# Patient Record
Sex: FEMALE | Race: WHITE
[De-identification: names, ages, dates, MRNs, and addresses within clinical notes are randomized per-mention and may not be internally consistent; named-entity substitution may affect disease eponyms.]

---

## 2017-05-08 ENCOUNTER — HOSPITAL ENCOUNTER (OUTPATIENT)
Dept: HOSPITAL 45 - C.MAMM | Age: 57
Discharge: HOME | End: 2017-05-08
Attending: OBSTETRICS & GYNECOLOGY
Payer: COMMERCIAL

## 2017-05-08 DIAGNOSIS — Z12.31: Primary | ICD-10-CM

## 2017-05-09 NOTE — MAMMOGRAPHY REPORT
BILATERAL DIGITAL SCREENING MAMMOGRAM TOMOSYNTHESIS WITH CAD: 5/8/2017

CLINICAL HISTORY: Routine screening.  Patient has no complaints.  





TECHNIQUE:  Breast tomosynthesis in addition to standard 2D mammography was performed. Current study
 was also evaluated with a Computer Aided Detection (CAD) system.  



COMPARISON: Comparison is made to exams dated:  5/3/2016 mammogram, 5/1/2015 mammogram, 4/30/2014 ma
mmogram - [facility], 3/28/2013 mammogram, 2/16/2011 mammogram, and 3/15/2012 mamm
ogram - [facility].   



BREAST COMPOSITION:  There are scattered areas of fibroglandular density in both breasts.  



FINDINGS: There are mild vascular calcifications in the breasts.  No suspicious mass, architectural 
distortion or cluster of microcalcifications is seen.  



IMPRESSION:  ACR BI-RADS CATEGORY 1: NEGATIVE

There is no mammographic evidence of malignancy. A 1 year screening mammogram is recommended.  The p
atient will receive written notification of the results.  





Approximately 10% of breast cancers are not detected with mammography. A negative mammographic repor
t should not delay biopsy if a clinically suggestive mass is present.



Carol Britt M.D.          

ay/:5/8/2017 21:44:42  



Imaging Technologist: Sushma BORDENR, M, [facility]

letter sent: Normal 1/2  

BI-RADS Code: ACR BI-RADS Category 1: Negative

## 2018-03-28 ENCOUNTER — HOSPITAL ENCOUNTER (INPATIENT)
Dept: HOSPITAL 45 - C.4E | Age: 58
LOS: 4 days | Discharge: HOME | DRG: 168 | End: 2018-04-01
Attending: HOSPITALIST | Admitting: HOSPITALIST
Payer: COMMERCIAL

## 2018-03-28 VITALS
HEART RATE: 92 BPM | OXYGEN SATURATION: 100 % | TEMPERATURE: 97.7 F | SYSTOLIC BLOOD PRESSURE: 116 MMHG | DIASTOLIC BLOOD PRESSURE: 81 MMHG

## 2018-03-28 VITALS
BODY MASS INDEX: 31.67 KG/M2 | HEIGHT: 69 IN | HEIGHT: 69 IN | WEIGHT: 213.85 LBS | BODY MASS INDEX: 31.67 KG/M2 | WEIGHT: 213.85 LBS | BODY MASS INDEX: 31.67 KG/M2

## 2018-03-28 VITALS
SYSTOLIC BLOOD PRESSURE: 146 MMHG | DIASTOLIC BLOOD PRESSURE: 77 MMHG | OXYGEN SATURATION: 97 % | HEART RATE: 82 BPM | TEMPERATURE: 97.52 F

## 2018-03-28 VITALS
DIASTOLIC BLOOD PRESSURE: 82 MMHG | OXYGEN SATURATION: 95 % | TEMPERATURE: 97.52 F | HEART RATE: 85 BPM | SYSTOLIC BLOOD PRESSURE: 141 MMHG

## 2018-03-28 VITALS — OXYGEN SATURATION: 98 % | HEART RATE: 92 BPM

## 2018-03-28 VITALS — HEART RATE: 88 BPM | OXYGEN SATURATION: 98 %

## 2018-03-28 VITALS — OXYGEN SATURATION: 98 % | HEART RATE: 95 BPM

## 2018-03-28 DIAGNOSIS — E66.9: ICD-10-CM

## 2018-03-28 DIAGNOSIS — Z88.0: ICD-10-CM

## 2018-03-28 DIAGNOSIS — J44.9: ICD-10-CM

## 2018-03-28 DIAGNOSIS — Z81.8: ICD-10-CM

## 2018-03-28 DIAGNOSIS — Z79.82: ICD-10-CM

## 2018-03-28 DIAGNOSIS — Z82.49: ICD-10-CM

## 2018-03-28 DIAGNOSIS — E10.9: ICD-10-CM

## 2018-03-28 DIAGNOSIS — Z51.81: ICD-10-CM
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
## 2018-03-28 DIAGNOSIS — Z79.899: ICD-10-CM

## 2018-03-28 DIAGNOSIS — Z83.49: ICD-10-CM

## 2018-03-28 DIAGNOSIS — E78.00: ICD-10-CM

## 2018-03-28 DIAGNOSIS — J45.909: ICD-10-CM

## 2018-03-28 DIAGNOSIS — T17.990A: Primary | ICD-10-CM

## 2018-03-28 DIAGNOSIS — I10: ICD-10-CM

## 2018-03-28 DIAGNOSIS — F32.9: ICD-10-CM

## 2018-03-28 DIAGNOSIS — J10.1: ICD-10-CM

## 2018-03-28 DIAGNOSIS — E78.5: ICD-10-CM

## 2018-03-28 LAB
BUN SERPL-MCNC: 12 MG/DL (ref 7–18)
CALCIUM SERPL-MCNC: 8.6 MG/DL (ref 8.5–10.1)
CO2 SERPL-SCNC: 24 MMOL/L (ref 21–32)
CREAT SERPL-MCNC: 1.19 MG/DL (ref 0.6–1.2)
EOSINOPHIL NFR BLD AUTO: 225 K/UL (ref 130–400)
FLUAV RNA SPEC QL NAA+PROBE: (no result)
FLUBV RNA SPEC QL NAA+PROBE: (no result)
GLUCOSE SERPL-MCNC: 360 MG/DL (ref 70–99)
HCT VFR BLD CALC: 38.6 % (ref 37–47)
HGB BLD-MCNC: 13.6 G/DL (ref 12–16)
INR PPP: 0.9 (ref 0.9–1.1)
MCH RBC QN AUTO: 28 PG (ref 25–34)
MCHC RBC AUTO-ENTMCNC: 35.2 G/DL (ref 32–36)
MCV RBC AUTO: 79.4 FL (ref 80–100)
PMV BLD AUTO: 8.7 FL (ref 7.4–10.4)
POTASSIUM SERPL-SCNC: 3.3 MMOL/L (ref 3.5–5.1)
PTT PATIENT: 22.8 SECONDS (ref 21–31)
RED CELL DISTRIBUTION WIDTH CV: 14.4 % (ref 11.5–14.5)
RED CELL DISTRIBUTION WIDTH SD: 41.4 FL (ref 36.4–46.3)
SODIUM SERPL-SCNC: 130 MMOL/L (ref 136–145)
WBC # BLD AUTO: 5.77 K/UL (ref 4.8–10.8)

## 2018-03-28 RX ADMIN — GABAPENTIN SCH MG: 300 CAPSULE ORAL at 21:00

## 2018-03-28 RX ADMIN — HUMAN INSULIN SCH MLS/HR: 100 INJECTION, SOLUTION SUBCUTANEOUS at 21:30

## 2018-03-28 RX ADMIN — IPRATROPIUM BROMIDE AND ALBUTEROL SULFATE SCH ML: .5; 3 SOLUTION RESPIRATORY (INHALATION) at 15:13

## 2018-03-28 RX ADMIN — OSELTAMIVIR PHOSPHATE SCH MG: 75 CAPSULE ORAL at 20:00

## 2018-03-28 RX ADMIN — ENOXAPARIN SODIUM SCH MG: 40 INJECTION SUBCUTANEOUS at 21:00

## 2018-03-28 RX ADMIN — BENZONATATE SCH MG: 100 CAPSULE ORAL at 20:00

## 2018-03-28 RX ADMIN — HUMAN INSULIN SCH MLS/HR: 100 INJECTION, SOLUTION SUBCUTANEOUS at 23:18

## 2018-03-28 RX ADMIN — IPRATROPIUM BROMIDE AND ALBUTEROL SULFATE SCH ML: .5; 3 SOLUTION RESPIRATORY (INHALATION) at 19:30

## 2018-03-28 RX ADMIN — BENZONATATE SCH MG: 100 CAPSULE ORAL at 15:29

## 2018-03-28 NOTE — HISTORY & PHYSICAL EXAMINATION
DATE OF ADMISSION:  03/28/2018

 

CHIEF COMPLAINT:  Cough and shortness of breath.

 

HISTORY OF PRESENT ILLNESS:  The patient is a very pleasant 57-year-old

female who notes that basically she has been sick nonstop since about the

second week of December.  She has had cough, shortness of breath, wheezing. 

She does note the last couple days have been worse.  Her overall pattern has

been that of an ongoing illness and on the last few days where she does note

also that she was recently unfortunately around a friend who came down with

influenza.  However, she does note that her symptoms have really been ongoing

for months.  Per Dr. Welsh, she has had a violent cough beginning around

[place] time, chest pressure worsened with coughing.  She has had a cardiac

workup that was negative.  CT of the chest with adenopathy, mild bronchial

wall thickening suggesting bronchitis, has been on multiple courses of

antibiotics and apparently oral steroids, but continues to have coughing

paroxysms that lead to several bouts of emesis daily.  She is extremely

tired, fatigued, chronic cough, hemoptysis has been noted.  She denies

fevers, chills, or sweats.  She has not had flu shot, not up to date on

pneumonia vaccine.  To me, she also notes that she has had dripping from her

washing machine, dripping through to the kitchen and she does wonder if she

has mold.

 

REVIEW OF SYSTEMS:  Otherwise negative, except for as above.

 

PAST MEDICAL HISTORY:  Includes chronic asthmatic bronchitis, type 1 diabetes

uncontrolled, depression, hyperlipidemia, and hypertension.

 

HOME MEDICATIONS:  Allegra 180 mg daily, vitamin C 500 mg daily, aspirin 81

mg daily, atorvastatin 40 mg daily, Tessalon 200 mg t.i.d. p.r.n. cough,

gabapentin 900 mg at bedtime, Humalog 1 unit per 5 carbs at breakfast and

lunch 1 unit per 3 carbs at dinner, DuoNebs, Lantus 60 units at bedtime,

lisinopril 2.5 daily, Paxil 10 mg daily, Singulair 10 mg daily, albuterol HFA

q. 4 hours p.r.n. shortness of breath or wheeze and vitamin D 2000

international units daily.

 

PAST SURGICAL HISTORY:  Includes back surgery.

 

FAMILY HISTORY:  Includes her dad having had cardiovascular disease.  There

is also a family history of hypertension and hyperlipidemia.

 

SOCIAL HISTORY:  She is .  She is never a smoker.  No significant

alcohol use.

 

ALLERGIES:  LISTED AS PENICILLINS.

 

PHYSICAL EXAMINATION:

VITAL SIGNS:  Temp 36.5, pulse 92, respiratory rate 20, blood pressure

116/81, 100% on room air.

GENERAL:  She is awake, alert, oriented x3, fatigued appearing, but otherwise

in no acute distress.

HEENT:  Normocephalic, atraumatic.  Mucous membranes are moist.

CARDIOVASCULAR:  Regular without rubs, murmurs, or gallops.

LUNGS:  Diminished bibasilar with scattered wheeze and rhonchi throughout,

more in the mid lungs and upper lungs and again definitely quite quiet at the

bases.  No accessory muscle use.

ABDOMEN:  Soft, nondistended, nontender, no masses or organomegaly.

EXTREMITIES:  Without cyanosis, clubbing or edema.  No calf tenderness.

SKIN:  Shows no rashes, no pallor or icterus.

NEUROLOGIC:  Shows cranial nerves II-XII to be grossly intact.  Gross motor

and sensory intact.

MUSCULOSKELETAL:  Yields no gross lesions.

MENTAL STATUS:  Shows good recent and remote recall.  Normal mood and affect.

 Good judgment and insight.

 

LABORATORIES AND DIAGNOSTICS:  Unavailable at this time.  I am ordering an

initial workup as well as Dr. Welsh is asking for the CT scan done in

[place] to be loaded into the system.

 

ASSESSMENT AND PLAN:

1.  Cough/ongoing asthmatic bronchitis.  The most likely differential for

this is that she is suffering from multiple acute respiratory illnesses.  It

has been a very bad flu in pneumonia season and with her baseline history of

asthmatic allergic asthma, it is very, very probable that she has simply been

suffering from 1 respiratory illness after another without any full ability

to recover.  So, it is quite probable that her likely markedly uncontrolled

diabetes suppressing her immune system set her up for this, but in the

meantime, she also appears to be acutely ill, probably with the flu or a

flu-like illness on top of all of this.  We will check a flu swab, treat with

Tamiflu if positive given the severity of her illness.  We will treat the

asthmatic portion of this with steroids and nebulizers.  We will check a

procalcitonin to help tease apart if there is any subtle indication for

antibiotics given that per Dr. Welsh none were noted on her review of her CT

scan.  We will ask pulmonary to see her.  It is possible she may need a

bronchoscopic evaluation and then we will follow her clinically.  In regards

to her possible mold exposure, it seems far more likely that she is suffering

from repeated and recurrent respiratory illnesses during the middle and tail

end of what has now been a rather bad flu season far more likely than

anything subtle and somewhat more esoteric like a house mold exposure. 

However, I discussed with her that certainly we should not rule this out

entirely empirically and if bronchoscopy is needed, that may provide some

insight as well as if she gets better in the hospital, but gets worse

whenever she goes home.  She notes other than this current hospitalization,

the only other time she was in the hospital was at 23 hours at [facility],

certainly not enough time to remove her from the allergens should that be the

case.

2.  Uncontrolled type 1 diabetes.  Her last A1c was 11.8 at the end of August

last year.  We will recheck an A1c certainly.  I anticipate the need to

dramatically ramp up her insulins with a degree of baseline poor control

superimposed with current steroids and acute illness.

3.  Influenza as above, Tamiflu.

4.  Hypertension.  Continue her lisinopril.

5.  Hyperlipidemia.  Continue Lipitor.

6.  Deep venous thrombosis prophylaxis, Lovenox.

## 2018-03-28 NOTE — HISTORY AND PHYSICAL
History & Physical


Date & Time of Service:


Mar 28, 2018 at 14:21


Chief Complaint:


Severe Asthmatic Bronchitis


Primary Care Physician:


Doug Khan D.O.


Past Medical/Surgical History


Medical Problems:


(1) Asthma








Immunizations


History of Influenza Vaccine:  No


History of Tetanus Vaccine?:  Yes


History of Pneumococcal:  No


History of Hepatitis B Vaccine:  No





Allergies


Coded Allergies:  


     Penicillins (Verified  Allergy, Mild, HIVES, 5/28/14)





Home Medications


Scheduled


Ascorbic Acid (Vitamin C), 1 TAB PO DAILY


Aspirin (Aspirin Ec), 81 MG PO DAILY


Aspirin (Aspirin EC Low Dose), 1 TAB PO DAILY


Atorvastatin (Lipitor), 1 TAB PO DAILY


Benzonatate (Tessalon Perles), 2 CAP PO TID


Cholecalciferol (Vitamin D3), 2 TAB PO DAILY


Fexofenadine Hcl (Allegra), 180 MG PO DAILY


Gabapentin (Gabapentin), 900 MG PO HS


Insulin Glargine (Lantus Solostar), 30 UNITS SQ HS


Insulin Glargine (Lantus Solostar), 60 UNIT SC HS


Insulin Human Lispro (Humalog Kwikpen), 1 UNIT SQ UD


Ipratropium-Albuterol (Duoneb), 1 TREATMENT INH QID


Lisinopril (Lisinopril), 1 TAB PO DAILY


Montelukast Sodium (Singulair), 1 TAB PO DAILY


Paroxetine Hcl (Paxil), 1 TAB PO DAILY





Physical Exam


Vital Signs











  Date Time  Temp Pulse Resp B/P (MAP) Pulse Ox O2 Delivery O2 Flow Rate FiO2


 


3/28/18 14:04      Room Air  


 


3/28/18 11:51 36.5 92 20 116/81 (93) 100 Room Air  











Diagnostics


Laboratory Results





Results Past 24 Hours








Test


  3/28/18


12:28 3/28/18


12:32 Range/Units


 


 


Influenza Type A (RT-PCR) POS for Influ A  NEG  


 


Influenza Type B (RT-PCR) Neg for Influ B  NEG  


 


White Blood Count  5.77 4.8-10.8  K/uL


 


Red Blood Count  4.86 4.2-5.4  M/uL


 


Hemoglobin  13.6 12.0-16.0  g/dL


 


Hematocrit  38.6 37-47  %


 


Mean Corpuscular Volume  79.4   fL


 


Mean Corpuscular Hemoglobin  28.0 25-34  pg


 


Mean Corpuscular Hemoglobin


Concent 


  35.2


  32-36  g/dl


 


 


RDW Standard Deviation  41.4 36.4-46.3  fL


 


RDW Coefficient of Variation  14.4 11.5-14.5  %


 


Platelet Count  225 130-400  K/uL


 


Mean Platelet Volume  8.7 7.4-10.4  fL


 


Prothrombin Time


  


  9.8


  9.0-12.0


SECONDS


 


Prothromb Time International


Ratio 


  0.9


  0.9-1.1  


 


 


Activated Partial


Thromboplast Time 


  22.8


  21.0-31.0


SECONDS


 


Partial Thromboplastin Ratio  0.9  


 


Sodium Level  130 136-145  mmol/L


 


Potassium Level  3.3 3.5-5.1  mmol/L


 


Chloride Level  96   mmol/L


 


Carbon Dioxide Level  24 21-32  mmol/L


 


Anion Gap  10.0 3-11  mmol/L


 


Blood Urea Nitrogen  12 7-18  mg/dl


 


Creatinine


  


  1.19


  0.60-1.20


mg/dl


 


Estimated GFR (


American) 


  59.5


   


 


 


Estimated GFR (Non-


American 


  51.4


   


 


 


BUN/Creatinine Ratio  10.3 10-20  


 


Random Glucose  360 70-99  mg/dl


 


Calcium Level  8.6 8.5-10.1  mg/dl


 


Beta-Hydroxybutyric Acid  3.87 0.2-2.81  mg/dL


 


Procalcitonin  < 0.05 0-0.5  ng/ml











Impression


Assessment and Plan


admit #[id number]





Resuscitation Status








VTE Prophylaxis


Will order VTE Prophylaxis:  Yes

## 2018-03-29 VITALS — OXYGEN SATURATION: 96 % | HEART RATE: 100 BPM

## 2018-03-29 VITALS
DIASTOLIC BLOOD PRESSURE: 79 MMHG | SYSTOLIC BLOOD PRESSURE: 135 MMHG | TEMPERATURE: 97.88 F | OXYGEN SATURATION: 98 % | HEART RATE: 87 BPM

## 2018-03-29 VITALS
HEART RATE: 84 BPM | DIASTOLIC BLOOD PRESSURE: 83 MMHG | OXYGEN SATURATION: 99 % | TEMPERATURE: 98.06 F | SYSTOLIC BLOOD PRESSURE: 130 MMHG

## 2018-03-29 VITALS — OXYGEN SATURATION: 98 % | HEART RATE: 78 BPM

## 2018-03-29 VITALS — OXYGEN SATURATION: 97 % | HEART RATE: 86 BPM

## 2018-03-29 VITALS
TEMPERATURE: 97.7 F | SYSTOLIC BLOOD PRESSURE: 157 MMHG | OXYGEN SATURATION: 97 % | HEART RATE: 77 BPM | DIASTOLIC BLOOD PRESSURE: 83 MMHG

## 2018-03-29 VITALS — HEART RATE: 91 BPM | OXYGEN SATURATION: 96 %

## 2018-03-29 VITALS — OXYGEN SATURATION: 98 %

## 2018-03-29 LAB
BUN SERPL-MCNC: 15 MG/DL (ref 7–18)
CALCIUM SERPL-MCNC: 9.2 MG/DL (ref 8.5–10.1)
CO2 SERPL-SCNC: 23 MMOL/L (ref 21–32)
CREAT SERPL-MCNC: 0.86 MG/DL (ref 0.6–1.2)
GLUCOSE SERPL-MCNC: 237 MG/DL (ref 70–99)
HBA1C MFR BLD: 11.3 % (ref 4.5–5.6)
POTASSIUM SERPL-SCNC: 4.2 MMOL/L (ref 3.5–5.1)
SODIUM SERPL-SCNC: 133 MMOL/L (ref 136–145)

## 2018-03-29 RX ADMIN — BENZONATATE SCH MG: 100 CAPSULE ORAL at 13:48

## 2018-03-29 RX ADMIN — PANTOPRAZOLE SCH MG: 40 TABLET, DELAYED RELEASE ORAL at 08:25

## 2018-03-29 RX ADMIN — HUMAN INSULIN SCH MLS/HR: 100 INJECTION, SOLUTION SUBCUTANEOUS at 22:22

## 2018-03-29 RX ADMIN — IPRATROPIUM BROMIDE AND ALBUTEROL SULFATE SCH ML: .5; 3 SOLUTION RESPIRATORY (INHALATION) at 15:23

## 2018-03-29 RX ADMIN — HUMAN INSULIN SCH MLS/HR: 100 INJECTION, SOLUTION SUBCUTANEOUS at 05:12

## 2018-03-29 RX ADMIN — MONTELUKAST SODIUM SCH MG: 10 TABLET, FILM COATED ORAL at 20:49

## 2018-03-29 RX ADMIN — HUMAN INSULIN SCH MLS/HR: 100 INJECTION, SOLUTION SUBCUTANEOUS at 02:26

## 2018-03-29 RX ADMIN — Medication SCH INTER.UNIT: at 08:27

## 2018-03-29 RX ADMIN — HUMAN INSULIN SCH MLS/HR: 100 INJECTION, SOLUTION SUBCUTANEOUS at 00:19

## 2018-03-29 RX ADMIN — GABAPENTIN SCH MG: 300 CAPSULE ORAL at 20:48

## 2018-03-29 RX ADMIN — METHYLPREDNISOLONE SODIUM SUCCINATE SCH MLS/MIN: 1 INJECTION, POWDER, FOR SOLUTION INTRAMUSCULAR; INTRAVENOUS at 13:48

## 2018-03-29 RX ADMIN — HUMAN INSULIN SCH MLS/HR: 100 INJECTION, SOLUTION SUBCUTANEOUS at 01:21

## 2018-03-29 RX ADMIN — Medication SCH MG: at 08:27

## 2018-03-29 RX ADMIN — METHYLPREDNISOLONE SODIUM SUCCINATE SCH MLS/MIN: 1 INJECTION, POWDER, FOR SOLUTION INTRAMUSCULAR; INTRAVENOUS at 01:22

## 2018-03-29 RX ADMIN — BENZONATATE SCH MG: 100 CAPSULE ORAL at 08:27

## 2018-03-29 RX ADMIN — INSULIN ASPART SCH UNITS: 100 INJECTION, SOLUTION INTRAVENOUS; SUBCUTANEOUS at 08:43

## 2018-03-29 RX ADMIN — ATORVASTATIN CALCIUM SCH MG: 40 TABLET, FILM COATED ORAL at 08:25

## 2018-03-29 RX ADMIN — INSULIN ASPART SCH UNITS: 100 INJECTION, SOLUTION INTRAVENOUS; SUBCUTANEOUS at 13:53

## 2018-03-29 RX ADMIN — BENZONATATE SCH MG: 100 CAPSULE ORAL at 20:48

## 2018-03-29 RX ADMIN — IPRATROPIUM BROMIDE AND ALBUTEROL SULFATE SCH ML: .5; 3 SOLUTION RESPIRATORY (INHALATION) at 19:10

## 2018-03-29 RX ADMIN — HUMAN INSULIN SCH MLS/HR: 100 INJECTION, SOLUTION SUBCUTANEOUS at 23:27

## 2018-03-29 RX ADMIN — GUAIFENESIN AND CODEINE PHOSPHATE PRN ML: 100; 10 SOLUTION ORAL at 16:55

## 2018-03-29 RX ADMIN — OXYCODONE HYDROCHLORIDE AND ACETAMINOPHEN SCH MG: 500 TABLET ORAL at 08:25

## 2018-03-29 RX ADMIN — PAROXETINE SCH MG: 20 TABLET, FILM COATED ORAL at 08:27

## 2018-03-29 RX ADMIN — INSULIN GLARGINE SCH UNITS: 100 INJECTION, SOLUTION SUBCUTANEOUS at 22:20

## 2018-03-29 RX ADMIN — HUMAN INSULIN SCH MLS/HR: 100 INJECTION, SOLUTION SUBCUTANEOUS at 16:16

## 2018-03-29 RX ADMIN — OSELTAMIVIR PHOSPHATE SCH MG: 75 CAPSULE ORAL at 20:47

## 2018-03-29 RX ADMIN — IPRATROPIUM BROMIDE AND ALBUTEROL SULFATE SCH ML: .5; 3 SOLUTION RESPIRATORY (INHALATION) at 11:21

## 2018-03-29 RX ADMIN — IPRATROPIUM BROMIDE AND ALBUTEROL SULFATE SCH ML: .5; 3 SOLUTION RESPIRATORY (INHALATION) at 07:17

## 2018-03-29 RX ADMIN — HUMAN INSULIN SCH MLS/HR: 100 INJECTION, SOLUTION SUBCUTANEOUS at 06:17

## 2018-03-29 RX ADMIN — Medication SCH MG: at 08:26

## 2018-03-29 RX ADMIN — ENOXAPARIN SODIUM SCH MG: 40 INJECTION SUBCUTANEOUS at 20:50

## 2018-03-29 RX ADMIN — OSELTAMIVIR PHOSPHATE SCH MG: 75 CAPSULE ORAL at 08:25

## 2018-03-29 NOTE — PROGRESS NOTE
Subjective


Date of Service:


Mar 29, 2018.


Subjective


Pt evaluation today including:  conversation w/ patient, physical exam, chart 

review, lab review, review of studies, conversation w/ consultant, review of 

inpatient medication list


Still persistent cough, is nonproductive, especially when deep breathing, 

associated with mild chest wall sore because of the cough





Review of Systems


Constitutional:  No fever, No chills, No sweats, No weight loss, No weakness, 

No fatigue, No problem reported


Eyes:  No worsening of vision, No eye pain, No redness, No discharge, No 

diplopia


ENT:  No hearing loss, No unusual epistaxis, No nasal symptoms, No sore throat, 

No tinnitus, No dental problems, No trouble swallowing


Respiratory:  + cough, No sputum, No wheezing, No shortness of breath, No 

dyspnea on exertion, No dyspnea at rest, No hemoptysis


Cardiac:  No chest pain, No orthopnea, No PND, No edema, No claudication, No 

palpitations


Abdomen:  No pain, No nausea, No vomiting, No diarrhea, No constipation


Musculoskeletal:  No joint pain, No muscle pain, No swelling, No calf pain


Female :  No dysuria, No urinary frequency, No hematuria, No incontinence, No 

abnormal vaginal bleeding, No vaginal discharge


Neurologic:  No memory loss, No paralysis, No weakness, No numbness/tingling, 

No vertigo, No balance problems


Psychiatric:  No depression symptoms, No anhedonism, No anxiety, No insomnia, 

No substance abuse


Heme:  No abnormal bleeding/bruising, No clotting problems, No swollen lymph 

nodes, No night sweats


Endo:  No fatigue, No excessive thirst, No excessive urination


Skin:  No rash, No itch, No new/changing skin lesions, No color change, No 

bleeding





Objective


Vital Signs











  Date Time  Temp Pulse Resp B/P (MAP) Pulse Ox O2 Delivery O2 Flow Rate FiO2


 


3/29/18 16:00     98 Room Air  


 


3/29/18 15:32 36.6 87 20 135/79 (97) 98 Room Air  


 


3/29/18 15:23  86 14  97 Room Air  


 


3/29/18 11:21  91 14  96 Room Air  


 


3/29/18 08:00      Room Air  


 


3/29/18 07:34 36.7 84 18 130/83 (99) 99 Room Air  


 


3/29/18 07:17  78 14  98 Room Air  


 


3/29/18 00:00      Room Air  


 


3/28/18 23:35 36.4 82 18 146/77 (100) 97 Room Air  


 


3/28/18 21:35  92 18  98 Room Air  


 


3/28/18 19:30  88 16  98 Room Air  











Physical Exam


General Appearance:  WD/WN, no apparent distress


Eyes:  normal inspection, PERRL, EOMI, sclerae normal


ENT:  normal ENT inspection, hearing grossly normal, pharynx normal


Neck:  supple, no adenopathy, thyroid normal, no JVD, no carotid bruits, 

trachea midline


Respiratory/Chest:  chest non-tender, normal breath sounds, no respiratory 

distress, no accessory muscle use, + decreased breath sounds, + wheezing (

Occasion)


Cardiovascular:  regular rate, rhythm, no edema, no gallop, no JVD, no murmur


Abdomen:  normal bowel sounds, non tender, soft, no organomegaly, no pulsatile 

mass


Extremities:  normal range of motion, non-tender, normal inspection, no pedal 

edema, no calf tenderness, normal capillary refill, pelvis stable


Neurologic/Psychiatric:  CNs II-XII nml as tested, no motor/sensory deficits, 

alert, normal mood/affect, oriented x 3


Skin:  normal color, warm/dry, no rash


Lymphatic:  no adenopathy





Laboratory Results





Last 24 Hours








Test


  3/28/18


17:19 3/28/18


17:35 3/28/18


20:07 3/28/18


23:00


 


Bedside Glucose 337 mg/dl   351 mg/dl  350 mg/dl 


 


Test


  3/28/18


23:58 3/29/18


01:03 3/29/18


02:10 3/29/18


03:04


 


Bedside Glucose 314 mg/dl  286 mg/dl  278 mg/dl  247 mg/dl 


 


Test


  3/29/18


04:15 3/29/18


05:03 3/29/18


06:03 3/29/18


06:08


 


Bedside Glucose 229 mg/dl  232 mg/dl   252 mg/dl 


 


Sodium Level   133 mmol/L  


 


Potassium Level   4.2 mmol/L  


 


Chloride Level   102 mmol/L  


 


Carbon Dioxide Level   23 mmol/L  


 


Anion Gap   8.0 mmol/L  


 


Blood Urea Nitrogen   15 mg/dl  


 


Creatinine   0.86 mg/dl  


 


Est Creatinine Clear Calc


Drug Dose 


  


  91.6 ml/min 


  


 


 


Estimated GFR (


American) 


  


  88.1 


  


 


 


Estimated GFR (Non-


American 


  


  76.1 


  


 


 


BUN/Creatinine Ratio   16.9  


 


Random Glucose   237 mg/dl  


 


Estimated Average Glucose   278 mg/dl  


 


Hemoglobin A1c   11.3 %  


 


Calcium Level   9.2 mg/dl  


 


Test


  3/29/18


07:05 3/29/18


08:05 3/29/18


08:59 3/29/18


09:55


 


Bedside Glucose 248 mg/dl  223 mg/dl  281 mg/dl  


 


Test


  3/29/18


10:23 3/29/18


11:00 3/29/18


12:02 3/29/18


13:15


 


Bedside Glucose 328 mg/dl  317 mg/dl  268 mg/dl  241 mg/dl 


 


Test


  3/29/18


14:13 3/29/18


15:04 3/29/18


16:00 


 


 


Bedside Glucose 260 mg/dl  224 mg/dl  180 mg/dl  











Assessment and Plan


57-year-old white female admission from Dr. Swan office because of cough 

and possible asthma, failed 3 full courses of antibiotics as outpatient





Cough/ongoing asthmatic bronchitis. 


Uncontrolled type 1 diabetes  A1c was 11.3 , increased Lantus to 62 unit at 

bedtime, continue insulin sliding scale


Influenza as above, Tamiflu.


Hypertension.  Continue her lisinopril.


Hyperlipidemia.  Continue Lipitor.





Continue steroids, nebulizer treatment, cough, pulmonology input appreciated, 

planning bronchoscope  tomorrow,


Continue Lantus, plus insulin sliding scale, has started Tamiflu,








Deep venous thrombosis prophylaxis, Lovenox.


Continued [facility] stay due to:  multiple IV medications needed


Discharge planning:  home

## 2018-03-29 NOTE — PULMONARY PROGRESS NOTE
DATE: 03/29/2018

 

TIME:  8:20 a.m.

 

SUBJECTIVE:  The patient's cough is still severe.  It did not change much. 

She had marked difficulty sleeping last night.  Part of this was because her

sugars were significantly elevated and they were frequently checking her

blood sugars.  The patient states that she was given some cough medicine with

codeine that did seem to help some.  She thinks her wheezing might be a

little less.  Nonetheless, the cough has not changed.  This, however, is how

it has been for a few months.

 

OBJECTIVE:

GENERAL:  The patient was coughing frequently.  Temperature is 36.7.  She has

not had any documented fever since admission.

ENT:  Exam is unchanged from yesterday.

CARDIAC:  Heart rate is 84 beats per minute.  The rhythm is regular.  Blood

pressure 130/83.  Saturation is 99% on room air.

LUNGS:  Lung fields reveal very slight wheeze posteriorly in the lower lung

fields.

EXTREMITIES:  Show no cyanosis, clubbing or edema.

 

Blood sugar this morning is 223.  It appears her maximum blood sugar was 351.

 

IMPRESSIONS:

1.  Intractable cough of undetermined etiology.

2.  Influenza A -- doubt this is the cause of her cough.

 

COMMENTS AND RECOMMENDATIONS:  The patient is receiving IV steroids, although

we had cut the dose down last evening.  She is on nebulizer treatments.  She

is on montelukast.  She is on fexofenadine as an antihistamine.  She is now

on pantoprazole for possible reflux.  She is also on benzonatate Perles.  In

spite of all this, she is coughing significantly.  It will take days to know

if the lisinopril is causing the cough.  Dr. Welsh was planning on doing a

bronchoscopy for tomorrow.  We will try and verify with him if indeed he is

planning on doing that.

## 2018-03-29 NOTE — PHARMACY PROGRESS NOTE
Glycemic Control Intl Consult


Date of Service


Mar 29, 2018.





Scope


Glycemic Pharmacist consulted by Dr Crespo on 3/28/18 for glycemic control 

and to write orders per [facility] inpatient glycemic control protocol





Objective


Weight (Kilograms):  97.000


Accuchecks BSG (last 24hrs):











Test


  3/28/18


12:32 3/28/18


15:32 3/28/18


17:19 3/28/18


20:07


 


Random Glucose


  360 mg/dl


(70-99) 


  


  


 


 


Bedside Glucose


  


  303 mg/dl


(70-90) 337 mg/dl


(70-90) 351 mg/dl


(70-90)


 


Test


  3/28/18


23:00 3/28/18


23:58 3/29/18


01:03 3/29/18


02:10


 


Bedside Glucose


  350 mg/dl


(70-90) 314 mg/dl


(70-90) 286 mg/dl


(70-90) 278 mg/dl


(70-90)


 


Test


  3/29/18


03:04 3/29/18


04:15 3/29/18


05:03 3/29/18


06:03


 


Bedside Glucose


  247 mg/dl


(70-90) 229 mg/dl


(70-90) 232 mg/dl


(70-90) 


 


 


Random Glucose


  


  


  


  237 mg/dl


(70-99)


 


Test


  3/29/18


06:08 3/29/18


07:05 3/29/18


08:05 


 


 


Bedside Glucose


  252 mg/dl


(70-90) 248 mg/dl


(70-90) 223 mg/dl


(70-90) 


 








Laboratory Data (last 24hrs)











Test


  3/28/18


12:32 3/29/18


06:03


 


Anion Gap 10.0 mmol/L  8.0 mmol/L 


 


BUN/Creatinine Ratio 10.3  16.9 


 


Blood Urea Nitrogen 12 mg/dl  15 mg/dl 


 


Creatinine 1.19 mg/dl  0.86 mg/dl 


 


Potassium Level 3.3 mmol/L  4.2 mmol/L 


 


Sodium Level 130 mmol/L  133 mmol/L 


 


White Blood Count 5.77 K/uL  


 


Hemoglobin A1c  11.3 % 








HbA1c











Test


  3/29/18


06:03


 


Hemoglobin A1c


  11.3 %


(4.5-5.6)  H











Recent Pertinent Medications


Outpatient Anti-diabetic Regimen: 


* Lantus 60 units qHS


* Humalog using CR 1 unit per 5 gm CHO w/ breakfast and lunch, 1 unit per 3 gm 

CHO w/ dinner





The patient is currently receiving:


* Basal insulin:              Lantus 60 units every 24 hours





* Correctional/prandial insulin:     Insulin drip per moderate stress protocol 

titrated to goal 140-180 mg/dL








Risk Factors for Insulin Resistance:


* Steroids: Solu-medrol 80 mg IV q8h started yesterday, this has been decreased 

to 40 mg IV q12h


* Infection: Influenza


* Diet: Regular diet





Assessment & Plan


ASSESSMENT:


* 58 y/o female with reported type 1 diabetes, admitted for influenza/asthmatic 

bronchitis


* Per CDE assessment, patient reports previously being on oral agents and 

switched to Lantus + Humalog.  She definitely seems to be more of a type 2 

diabetic with the history of oral agent use and high doses of insulin


* A1c is uncontrolled.  She apparently was not checking BSGs or taking insulin 

PTA because of not feeling well.  She did NOT take her dose of Lantus on 3/27.


* Upon admission, she had BSGs in the 300s and was started on an aggressive 

Novolog scale in addition to her outpatient Lantus dose.  When pharmacy 

received the consult last evening, an insulin drip was initiated d/t sustained 

elevated BSGs after fairly high doses of Novolog and continued high dose 

steroids.


* This AM, insulin drip at 7.2 units/hr and has been increasing.  BSGs improved 

but still above goal.


* Plan will be to continue the outpatient Lantus but increase by 50%, giving an 

additional dose this AM


* Will also continue the insulin drip d/t high dose steroids and high drip 

rates.  Of note, patient may be NPO tomorrow for a bronch.








PLAN FOR INPATIENT GLYCEMIC CONTROL:


* Continue insulin drip with goal 140-180 mg/dL


 * Will provide orders for d/c of drip if rates fall to less than 1 unit/hr 

once basal insulin back on board


* Give additional 30 units of Lantus this AM, continue 60 units qPM


* Use set carb ratio of 1 unit per 3 gm CHO instead of insulin drip carb ratio


* Change diet to type 1 diabetes





* Please note that the plan above was derived based on current level of insulin 

resistance and hospital stress. These recommendations are appropriate for 

inpatient admission only. Plan of care upon discharge will need to be 

reassessed to avoid potential outpatient hypo/hyperglycemia. 





Thank you.

## 2018-03-29 NOTE — PULMONARY CONSULTATION
DATE OF CONSULTATION:  2018

 

TIME:  04:25 p.m.

 

HISTORY OF PRESENT ILLNESS:  The patient is a 57-year-old female with a chief

complaint of a severe cough.  This began in about the second week of

December.  She went to an urgent care on .  She subsequently

went to urgent care again on  because she was not feeling

better.  She has followed up with her family doctor.  Over the course of the

winter, she has been treated with 3 courses of prednisone without any

benefit.  She has had antibiotics including azithromycin and doxycycline

without benefit.  She has had benzonatate without benefit.  She has been on

nebulizer treatments with albuterol and ipratropium without significant

benefit.  She had Advair without benefit and it seemed to bother her throat. 

The cough bothers her daytime and nighttime.  She awakened several times at

night.  She has had severe episodes of coughing, which precipitated vomiting.

 On some days, this occurs 3 or 4 times.  The cough is violent at times.  She

feels tight.  She notices wheezing.  She went on a cruise in the [facility] in

the early new year.  She was definitely sick before the cruise.  The

temperature was so cold that she actually was staying most of the time inside

her cabin.  Her  has not been sick until just the last day or two. 

She has noticed a small amount of coryza in the past couple of days.  She

denies heartburn except recently she has been getting heartburn while she

drinks coffee.  She has had recurring nosebleeds on the left side.  She

states it is almost every day and she will have to pack it for a few minutes.

 

PAST SURGICAL HISTORY:

1.  Back surgery x2.

2.  Deviated septum repair in 2017.

 

PAST MEDICAL HISTORY:

1.  Diabetes mellitus x9 years.

2.  Hypercholesterolemia.

3.  Prior hypertension, but subsequently she had low blood pressure and has

been off of medicines except for being on lisinopril which she states was not

specifically for the blood pressure.

4.  Mild obesity.

 

SOCIAL HISTORY:  Tobacco never.  ETOH -- None.

 

ALLERGIES:  PENICILLIN.

 

FAMILY HISTORY:  Mother  age 72, dementia.  Father  age 54,

heart disease.

 

MEDICATIONS AT HOME:

1.  Ascorbic acid.

2.  Aspirin.

3.  Atorvastatin.

4.  Benzonatate.

5.  Cholecalciferol.

6.  Fexofenadine.

7.  Gabapentin.

8.  Lantus insulin.

9.  Humalog insulin.

10.  Albuterol/ipratropium by nebulizer.

11.  Lisinopril 2.5 mg daily, which she has been on for 8 months.

12.  Montelukast 10 mg daily.

13.  Paroxetine 10 mg daily.

 

REVIEW OF SYSTEMS:  Negative except for the above-mentioned complaints.

 

ENVIRONMENTAL HISTORY:  The patient has 2 cats, which she has had her entire

life.  [place]

 

PHYSICAL EXAMINATION:

GENERAL:  The patient is a pleasant 57-year-old female who was cooperative,

alert and oriented.  She was in no distress at rest, but she was frequently

coughing.  Weight is 97 kilograms.  BMI is 31.6.

VITAL SIGNS:  Temperature is 36.5.

HEENT:  Pupils were reactive to light.  Nares were unremarkable.  Mouth exam

was unremarkable.

NECK:  Palpation of the neck reveals no lymph nodes.

CHEST:  Her respiratory rate was 20 breaths per minute.  Wheezing was heard

posteriorly bilaterally.  Mild scattered rales were heard.  Oxygen saturation

is 98%.

ABDOMEN:  Soft.  Bowel sounds were present.  There was no tenderness to

palpation or masses.

EXTREMITIES:  Showed no cyanosis, clubbing or edema.  No rashes were noted.

 

The patient had a CT angio of the chest at [facility] in 2018. 

There was no pulmonary emboli.  She had some calcified mediastinal lymph

nodes.

 

LABORATORY DATA:  White count is 5.77.  Hemoglobin 13.6.  Platelets 225,000. 

Sodium is 130, potassium 3.3, chloride 96, and bicarbonate 24.  Random blood

sugar was 360.  BUN was 12 with a creatinine of 1.19.  Procalcitonin is less

than 0.05.  Flu test is positive for flu A.

 

IMPRESSIONS:

1.  Intractable undeterminted etiology.

2.  Influenza A.

 

The etiology of the cough is not clear.  On clinical grounds.  She is sounded

like asthmatic bronchitis.  She has never coughed up any phlegm.  She has not

responded to any treatment.  Could consider reflux worsening her symptoms. 

She has been on lisinopril for about 8 months.  Her cough is dry as the

lisinopril coughs are, but she does sound a little wheezy, which is less

common.  Nonetheless, I believe lisinopril should be stopped.  I believe we

should order hypersensitivity pneumonitis profile.  She does require

potassium supplementation. Dr. Welsh is planning on doing a scope on Friday

unless her symptoms are much improved.  The possibility exists that she does

have a lot of inspissated secretions that cannot be cleared.  In light of her

severe elevation of blood sugars, I think the methylprednisolone should be

decreased to a significantly lower dose, perhaps 40 mg q. 12 hours. Case 
discussed 

with Dr. Jones.

 

Thank you for asking me to assist in her care.

 

 

 

ANGELI

## 2018-03-30 VITALS — HEART RATE: 85 BPM | OXYGEN SATURATION: 97 %

## 2018-03-30 VITALS
HEART RATE: 75 BPM | TEMPERATURE: 98.06 F | DIASTOLIC BLOOD PRESSURE: 79 MMHG | OXYGEN SATURATION: 94 % | SYSTOLIC BLOOD PRESSURE: 147 MMHG

## 2018-03-30 VITALS
TEMPERATURE: 97.52 F | HEART RATE: 79 BPM | OXYGEN SATURATION: 97 % | SYSTOLIC BLOOD PRESSURE: 132 MMHG | DIASTOLIC BLOOD PRESSURE: 75 MMHG

## 2018-03-30 VITALS
HEART RATE: 76 BPM | TEMPERATURE: 98.24 F | SYSTOLIC BLOOD PRESSURE: 147 MMHG | OXYGEN SATURATION: 97 % | DIASTOLIC BLOOD PRESSURE: 85 MMHG

## 2018-03-30 VITALS
OXYGEN SATURATION: 97 % | TEMPERATURE: 97.7 F | DIASTOLIC BLOOD PRESSURE: 84 MMHG | SYSTOLIC BLOOD PRESSURE: 157 MMHG | HEART RATE: 84 BPM

## 2018-03-30 VITALS — HEART RATE: 71 BPM | OXYGEN SATURATION: 96 %

## 2018-03-30 VITALS
TEMPERATURE: 98.06 F | SYSTOLIC BLOOD PRESSURE: 145 MMHG | DIASTOLIC BLOOD PRESSURE: 83 MMHG | OXYGEN SATURATION: 96 % | HEART RATE: 78 BPM

## 2018-03-30 VITALS — HEART RATE: 74 BPM | DIASTOLIC BLOOD PRESSURE: 80 MMHG | SYSTOLIC BLOOD PRESSURE: 132 MMHG | OXYGEN SATURATION: 96 %

## 2018-03-30 VITALS
OXYGEN SATURATION: 97 % | SYSTOLIC BLOOD PRESSURE: 138 MMHG | DIASTOLIC BLOOD PRESSURE: 79 MMHG | TEMPERATURE: 97.52 F | HEART RATE: 77 BPM

## 2018-03-30 VITALS — HEART RATE: 77 BPM | OXYGEN SATURATION: 95 %

## 2018-03-30 VITALS — OXYGEN SATURATION: 95 % | HEART RATE: 78 BPM

## 2018-03-30 PROCEDURE — 0BCB8ZZ EXTIRPATION OF MATTER FROM LEFT LOWER LOBE BRONCHUS, VIA NATURAL OR ARTIFICIAL OPENING ENDOSCOPIC: ICD-10-PCS | Performed by: INTERNAL MEDICINE

## 2018-03-30 PROCEDURE — 0B9F8ZX DRAINAGE OF RIGHT LOWER LUNG LOBE, VIA NATURAL OR ARTIFICIAL OPENING ENDOSCOPIC, DIAGNOSTIC: ICD-10-PCS | Performed by: INTERNAL MEDICINE

## 2018-03-30 PROCEDURE — 0BC68ZZ EXTIRPATION OF MATTER FROM RIGHT LOWER LOBE BRONCHUS, VIA NATURAL OR ARTIFICIAL OPENING ENDOSCOPIC: ICD-10-PCS | Performed by: INTERNAL MEDICINE

## 2018-03-30 PROCEDURE — 0B9J8ZX DRAINAGE OF LEFT LOWER LUNG LOBE, VIA NATURAL OR ARTIFICIAL OPENING ENDOSCOPIC, DIAGNOSTIC: ICD-10-PCS | Performed by: INTERNAL MEDICINE

## 2018-03-30 RX ADMIN — HUMAN INSULIN SCH MLS/HR: 100 INJECTION, SOLUTION SUBCUTANEOUS at 17:15

## 2018-03-30 RX ADMIN — OSELTAMIVIR PHOSPHATE SCH MG: 75 CAPSULE ORAL at 22:11

## 2018-03-30 RX ADMIN — HUMAN INSULIN SCH MLS/HR: 100 INJECTION, SOLUTION SUBCUTANEOUS at 16:40

## 2018-03-30 RX ADMIN — BENZONATATE SCH MG: 100 CAPSULE ORAL at 11:03

## 2018-03-30 RX ADMIN — INSULIN ASPART SCH UNITS: 100 INJECTION, SOLUTION INTRAVENOUS; SUBCUTANEOUS at 10:49

## 2018-03-30 RX ADMIN — IPRATROPIUM BROMIDE AND ALBUTEROL SULFATE SCH ML: .5; 3 SOLUTION RESPIRATORY (INHALATION) at 12:09

## 2018-03-30 RX ADMIN — METHYLPREDNISOLONE SODIUM SUCCINATE SCH MLS/MIN: 1 INJECTION, POWDER, FOR SOLUTION INTRAMUSCULAR; INTRAVENOUS at 02:04

## 2018-03-30 RX ADMIN — HUMAN INSULIN SCH MLS/HR: 100 INJECTION, SOLUTION SUBCUTANEOUS at 05:03

## 2018-03-30 RX ADMIN — INSULIN ASPART SCH UNITS: 100 INJECTION, SOLUTION INTRAVENOUS; SUBCUTANEOUS at 18:16

## 2018-03-30 RX ADMIN — BENZONATATE SCH MG: 100 CAPSULE ORAL at 12:32

## 2018-03-30 RX ADMIN — Medication SCH MG: at 12:32

## 2018-03-30 RX ADMIN — GUAIFENESIN AND CODEINE PHOSPHATE PRN ML: 100; 10 SOLUTION ORAL at 12:31

## 2018-03-30 RX ADMIN — IPRATROPIUM BROMIDE AND ALBUTEROL SULFATE SCH ML: .5; 3 SOLUTION RESPIRATORY (INHALATION) at 15:46

## 2018-03-30 RX ADMIN — ENOXAPARIN SODIUM SCH MG: 40 INJECTION SUBCUTANEOUS at 21:00

## 2018-03-30 RX ADMIN — HUMAN INSULIN SCH MLS/HR: 100 INJECTION, SOLUTION SUBCUTANEOUS at 19:24

## 2018-03-30 RX ADMIN — IPRATROPIUM BROMIDE AND ALBUTEROL SULFATE SCH ML: .5; 3 SOLUTION RESPIRATORY (INHALATION) at 19:48

## 2018-03-30 RX ADMIN — INSULIN GLARGINE SCH UNITS: 100 INJECTION, SOLUTION SUBCUTANEOUS at 22:18

## 2018-03-30 RX ADMIN — HUMAN INSULIN SCH MLS/HR: 100 INJECTION, SOLUTION SUBCUTANEOUS at 22:51

## 2018-03-30 RX ADMIN — INSULIN ASPART SCH UNITS: 100 INJECTION, SOLUTION INTRAVENOUS; SUBCUTANEOUS at 22:19

## 2018-03-30 RX ADMIN — PAROXETINE SCH MG: 20 TABLET, FILM COATED ORAL at 12:32

## 2018-03-30 RX ADMIN — HUMAN INSULIN SCH MLS/HR: 100 INJECTION, SOLUTION SUBCUTANEOUS at 00:32

## 2018-03-30 RX ADMIN — PANTOPRAZOLE SCH MG: 40 TABLET, DELAYED RELEASE ORAL at 12:32

## 2018-03-30 RX ADMIN — METHYLPREDNISOLONE SODIUM SUCCINATE SCH MLS/MIN: 1 INJECTION, POWDER, FOR SOLUTION INTRAMUSCULAR; INTRAVENOUS at 13:21

## 2018-03-30 RX ADMIN — BENZONATATE SCH MG: 100 CAPSULE ORAL at 22:12

## 2018-03-30 RX ADMIN — ATORVASTATIN CALCIUM SCH MG: 40 TABLET, FILM COATED ORAL at 12:32

## 2018-03-30 RX ADMIN — HUMAN INSULIN SCH MLS/HR: 100 INJECTION, SOLUTION SUBCUTANEOUS at 13:21

## 2018-03-30 RX ADMIN — HUMAN INSULIN SCH MLS/HR: 100 INJECTION, SOLUTION SUBCUTANEOUS at 03:15

## 2018-03-30 RX ADMIN — INSULIN ASPART SCH UNITS: 100 INJECTION, SOLUTION INTRAVENOUS; SUBCUTANEOUS at 13:20

## 2018-03-30 RX ADMIN — HUMAN INSULIN SCH MLS/HR: 100 INJECTION, SOLUTION SUBCUTANEOUS at 14:09

## 2018-03-30 RX ADMIN — OSELTAMIVIR PHOSPHATE SCH MG: 75 CAPSULE ORAL at 12:33

## 2018-03-30 RX ADMIN — MONTELUKAST SODIUM SCH MG: 10 TABLET, FILM COATED ORAL at 22:11

## 2018-03-30 RX ADMIN — HUMAN INSULIN SCH MLS/HR: 100 INJECTION, SOLUTION SUBCUTANEOUS at 23:20

## 2018-03-30 RX ADMIN — Medication SCH INTER.UNIT: at 12:32

## 2018-03-30 RX ADMIN — IPRATROPIUM BROMIDE AND ALBUTEROL SULFATE SCH ML: .5; 3 SOLUTION RESPIRATORY (INHALATION) at 07:06

## 2018-03-30 RX ADMIN — GABAPENTIN SCH MG: 300 CAPSULE ORAL at 22:11

## 2018-03-30 RX ADMIN — OXYCODONE HYDROCHLORIDE AND ACETAMINOPHEN SCH MG: 500 TABLET ORAL at 12:32

## 2018-03-30 NOTE — [facility] OPERATIVE REPORT
Operative Report


Operative Date


Mar 30, 2018.





Pre-Operative Diagnosis





Severe Asthmaic Bronchitis





Post-Operative Diagnosis





Severe Asthmaic Bronchitis





Procedure(s) Performed





Bronchoscopy





Surgeon


Blaise





Assistant Surgeon(s)


None





Estimated Blood Loss


0





Findings


Severe Asthmatic Bronchitis w mucoid impaction





Specimens





Right and Left bronchial alveolar lavage





Disposition





I attest to the content of the Intraoperative Record and any orders documented 

therein.  Any exceptions are noted below.

## 2018-03-30 NOTE — PRE SEDATION ASSESSMENT
Pre Sedation Assessment


General


Date of Sedation:


Mar 30, 2018.





Vital Signs Past 12 Hours








  Date Time  Temp Pulse Resp B/P (MAP) Pulse Ox O2 Delivery O2 Flow Rate FiO2


 


3/30/18 07:41 36.8 76 18 147/85 (105) 97 Room Air  


 


3/30/18 07:08  85 18  97 Room Air  


 


3/30/18 00:00      Room Air  


 


3/29/18 23:46 36.5 77 18 157/83 (107) 97 Room Air  











Pre-Sedation Airway Assessment


Smoking Status:  Unknown if Ever Smoked


Short Thick Neck:  No


Mallampati Classification:  Class II


ASA Classification:  Class III





Procedure Planning


Contraindications for Sedation:  None


Current Medications Reviewed:  Yes





Notes








The planned sedation has been discussed with the patient. Informed Consent was 

obtained.  I have identified the patient, determined the appropriateness of 

sedation and have assessed the patient immediately prior to the procedure.  





All medicine(s) and interventions are by my order.

## 2018-03-30 NOTE — PROGRESS NOTE
Subjective


Date of Service:


Mar 30, 2018.


Subjective


Pt evaluation today including:  conversation w/ patient, physical exam, chart 

review, lab review, review of studies, review of inpatient medication list


Bronchoscope this morning, not feeling tired, occasional wheezing, no appetite,





Review of Systems


Constitutional:  + weakness, + fatigue, No fever, No chills, No sweats, No 

weight loss, No problem reported


Eyes:  No worsening of vision, No eye pain, No redness, No discharge, No 

diplopia


ENT:  No hearing loss, No unusual epistaxis, No nasal symptoms, No sore throat, 

No tinnitus, No dental problems, No trouble swallowing


Respiratory:  + cough, + wheezing, No sputum, No shortness of breath, No 

dyspnea on exertion, No dyspnea at rest, No hemoptysis


Cardiac:  + edema, No chest pain, No orthopnea, No PND, No claudication, No 

palpitations


Abdomen:  No pain, No nausea, No vomiting, No diarrhea, No constipation


Musculoskeletal:  No joint pain, No muscle pain, No swelling, No calf pain


Female :  No dysuria, No urinary frequency, No hematuria, No incontinence, No 

abnormal vaginal bleeding, No vaginal discharge


Neurologic:  No memory loss, No paralysis, No weakness, No numbness/tingling, 

No vertigo, No balance problems


Psychiatric:  No depression symptoms, No anhedonism, No anxiety, No insomnia, 

No substance abuse


Heme:  No abnormal bleeding/bruising, No clotting problems, No swollen lymph 

nodes, No night sweats


Endo:  No fatigue, No excessive thirst, No excessive urination


Skin:  No rash, No itch, No new/changing skin lesions, No color change, No 

bleeding





Objective


Vital Signs











  Date Time  Temp Pulse Resp B/P (MAP) Pulse Ox O2 Delivery O2 Flow Rate FiO2


 


3/30/18 14:44 36.5 84 18 157/84 (108) 97  2.0 


 


3/30/18 12:00 36.4 77 18 138/79 (98) 97 Nasal Cannula 3.0 


 


3/30/18 11:30 36.4 79 18 132/75 (94) 97 Nasal Cannula 3.0 


 


3/30/18 11:13      Oxymask  


 


3/30/18 11:13  71 18  96 Room Air 2.0 


 


3/30/18 11:00  74 16 132/80 (97) 96 Nasal Cannula 4.0 


 


3/30/18 10:45 36.7 78 18 145/83 (103) 96 Nasal Cannula 4.0 


 


3/30/18 10:35  81 22 134/75 96 Nasal Cannula 4 


 


3/30/18 10:30  81 22 140/70 95 Nasal Cannula 4 


 


3/30/18 10:25  78 20 128/74 100 Mask 6 


 


3/30/18 10:20  83 20 136/78 97 Mask 6 


 


3/30/18 10:15  102 22 148/93 100 Mask 6 


 


3/30/18 10:10  96 22 143/87 93 Mask 6 


 


3/30/18 10:05  91 18 158/94 94 Mask 6 


 


3/30/18 09:53  84 18 150/84 94 Room Air  


 


3/30/18 08:00      Room Air  


 


3/30/18 07:41 36.8 76 18 147/85 (105) 97 Room Air  


 


3/30/18 07:08  85 18  97 Room Air  


 


3/30/18 00:00      Room Air  


 


3/29/18 23:46 36.5 77 18 157/83 (107) 97 Room Air  


 


3/29/18 19:11  100 18  96 Room Air  


 


3/29/18 16:00     98 Room Air  











Physical Exam


General Appearance:  WD/WN, no apparent distress, + obese


Eyes:  normal inspection, PERRL, EOMI, sclerae normal


ENT:  normal ENT inspection, hearing grossly normal, pharynx normal


Neck:  supple, no adenopathy, thyroid normal, no JVD, no carotid bruits, 

trachea midline


Respiratory/Chest:  chest non-tender, normal breath sounds, no respiratory 

distress, no accessory muscle use, + decreased breath sounds, + wheezing


Cardiovascular:  regular rate, rhythm, no edema, no gallop, no JVD, no murmur


Abdomen:  normal bowel sounds, non tender, soft, no organomegaly, no pulsatile 

mass


Extremities:  normal range of motion, non-tender, normal inspection, no pedal 

edema, no calf tenderness, normal capillary refill, pelvis stable


Neurologic/Psychiatric:  CNs II-XII nml as tested, no motor/sensory deficits, 

alert, normal mood/affect, oriented x 3


Skin:  normal color, warm/dry, no rash


Lymphatic:  no adenopathy





Laboratory Results





Last 24 Hours








Test


  3/29/18


16:00 3/29/18


17:10 3/29/18


17:36 3/29/18


20:11


 


Bedside Glucose 180 mg/dl  134 mg/dl  124 mg/dl  378 mg/dl 


 


Test


  3/29/18


21:56 3/29/18


23:07 3/30/18


00:27 3/30/18


01:27


 


Bedside Glucose 373 mg/dl  331 mg/dl  276 mg/dl  230 mg/dl 


 


Test


  3/30/18


02:35 3/30/18


04:19 3/30/18


04:58 3/30/18


06:10


 


Bedside Glucose 130 mg/dl  67 mg/dl  120 mg/dl  119 mg/dl 


 


Test


  3/30/18


07:00 3/30/18


07:53 3/30/18


09:04 3/30/18


10:15


 


Bedside Glucose 110 mg/dl  124 mg/dl  143 mg/dl  


 


Test


  3/30/18


10:45 3/30/18


12:06 


  


 


 


Bedside Glucose 157 mg/dl  172 mg/dl   











Assessment and Plan


57-year-old white female admission from Dr. Welsh's office because of cough 

and possible asthma, failed 3 full courses of antibiotics as outpatient





Cough/ongoing asthmatic bronchitis, bronchoscope done today by Dr. Welsh, 

procedure went well





per report: Possible have some mucus plugging


Discussed with Dr. Welsh, taper down Solu-Medrol to oral prednisone, continue 

nebulizer treatment, increase activity, and follow-up with him in his office in 

1 week





Influenza as above, Tamiflu, continue








Uncontrolled type 1 diabetes  A1c was 11.3 , increased Lantus to 62 unit at 

bedtime, continue insulin sliding scale, taper down off steroids will help 

hypoglycemic condition





Hypertension.  Has discontinued  her lisinopril, the cough may be from 

lisinopril too, will watch her blood pressure never


Hyperlipidemia.  Continue Lipitor.





Continue steroids, nebulizer treatment, cough, pulmonology input appreciated, 








Deep venous thrombosis prophylaxis, Lovenox.





Possible discharge home tomorrow


Continued [facility] stay due to:  multiple IV medications needed


Discharge planning:  home

## 2018-03-30 NOTE — POST SEDATION ASSESSMENT
Post Sedation Assessment


General


Date of Sedation


Mar 30, 2018.


Vital Signs:





Vital Signs Past 12 Hours








  Date Time  Temp Pulse Resp B/P (MAP) Pulse Ox O2 Delivery O2 Flow Rate FiO2


 


3/30/18 10:15  102 22 148/93 100 Mask 6 


 


3/30/18 10:10  96 22 143/87 93 Mask 6 


 


3/30/18 10:05  91 18 158/94 94 Mask 6 


 


3/30/18 09:53  84 18 150/84 94 Room Air  


 


3/30/18 08:00      Room Air  


 


3/30/18 07:41 36.8 76 18 147/85 (105) 97 Room Air  


 


3/30/18 07:08  85 18  97 Room Air  


 


3/30/18 00:00      Room Air  


 


3/29/18 23:46 36.5 77 18 157/83 (107) 97 Room Air  











Post Procedure Recovery Score


Activity:  (2) Moves 4 extremities *


Respiration:  (2) Deep breath/cough


Circulation:  (2) +/-20% PreAnes Value


Consciousness:  (1) Arouseable (by name)


Oxygen Saturation:  (1) O2 needed for >90%





Discharge Sedation


Level of Care:  Fast Track Phase II





Post Sedation Plan


On clinical assessment, the patient appears to have tolerated the sedation 

without complications. Patient is recovering as anticipated.





Patient will continue to be monitored by nursing and may be discharged when 

sedation discharge criteria are met per below protocol. 





Upon Completions of procedure and additional 15 minutes continue every 5 minute 

vital signs and the P.A.R. score; then discharge to a Phase I or Fast Track to 

Phase II per the following guidelines:





* Discharge Patient to appropriate Phase II area if PAR is 8 or greater or 

return to pre- procedure baseline.  The post - procedure orders will be as 

directed. 





* If PAR score is less than 8 or not return to pre-procedure baseline then 

patient will follow Phase I monitoring till PAR is reached for Phase II.  The 

Phase I may be done in procedure room or may call to secure a Phase I area.





* If naloxone or flumazenil are used for reversal, hold in Phase I for an 

additional 60 -120 minutes before discharge to Phase II.  Please call the 

Sedation Physician to re-evaluate and complete post-note for discharge to Phase 

II area. 





Do NOT discharge from procedure sedation or Phase 1 until post- sedation 

evaluation note is complete by procedure /sedation MD





Sedation Discharge Instructions to be given to the patient at discharge to home.

## 2018-03-30 NOTE — OPERATIVE REPORT
DATE OF OPERATION:  03/30/2018

 

PROCEDURE:  Fiberoptic bronchoscopy with bronchoalveolar lavage.

 

INDICATIONS:  Severe asthmatic bronchitis, rule out mucoid impaction.

 

ANESTHESIA PREOPERATIVELY:  None.

 

ANESTHESIA DURING THE PROCEDURE:  5 mg of IV Versed, 100 mcg IV of fentanyl,

20 mL of 2% Xylocaine spray above and below the cords, and 4% viscous

Xylocaine intranasally.

 

DESCRIPTION OF PROCEDURE:  Fiberoptic bronchoscope was inserted into the

right naris with minimal difficulty and passed to the level of the true vocal

cords.  The cords appeared to approximate normally with phonation without

evidence of lesions or paralysis.  The area was anesthetized with 2%

Xylocaine spray and the scope was then introduced into the right and left

tracheobronchial tree.  The subglottic region and trachea were well within

normal limits.  The lila was sharp.  The right main stem bronchus was

explored initially and a global degree of moderately severe inflammatory

mucosal change was seen.  The right upper lobe, the apical posterior and

anterior segments, bronchus intermedius, right middle lobe and the medial

lateral segments and all basilar segments of the right lower lobe were found

to be free of endobronchial lesions with a mild to moderate amount of

mucoviscous secretion lavaged from each lobar segment to clear.  Mucus

plugging was visible with following installation of normal saline at the

level of the right lower lobe orifice and segmental bronchi.  The aspirate

was then sent for appropriate studies.  Left tracheobronchial tree was then

explored and no endobronchial lesions were seen.  Left upper lobe, the

apical-posterior and anterior segments, lingular subdivision of the superior

and inferior segments and all basilar segments of the left lower lobe were

found to be free of endobronchial lesions down to subsegmental bronchi and

after copiously lavaged with normosol, small whitish mucus plugs were seen,

lavaged and aspirated from the subsegmental bronchi involving the left lower

lobe.  Following completion of this procedure, no brushings or biopsies were

deemed necessary.  Fluoroscopy was not utilized.  The procedure was

terminated and the patient was given a nebulizer treatment of Xopenex 1.25 mg

and then transferred back to the medical floor, hemodynamically stable.  No

signs of respiratory compromise.  We will await microbiological and cytologic

examination of the bronchial washings.

 

 

I attest to the content of the Intraoperative Record and any orders documented therein. Any exception
s are noted below.

## 2018-03-31 VITALS
HEART RATE: 97 BPM | SYSTOLIC BLOOD PRESSURE: 151 MMHG | DIASTOLIC BLOOD PRESSURE: 95 MMHG | OXYGEN SATURATION: 94 % | TEMPERATURE: 97.7 F

## 2018-03-31 VITALS
HEART RATE: 90 BPM | TEMPERATURE: 97.34 F | SYSTOLIC BLOOD PRESSURE: 154 MMHG | DIASTOLIC BLOOD PRESSURE: 77 MMHG | OXYGEN SATURATION: 96 %

## 2018-03-31 VITALS — OXYGEN SATURATION: 97 % | HEART RATE: 75 BPM

## 2018-03-31 VITALS — OXYGEN SATURATION: 96 % | HEART RATE: 74 BPM

## 2018-03-31 VITALS
TEMPERATURE: 97.88 F | OXYGEN SATURATION: 93 % | SYSTOLIC BLOOD PRESSURE: 155 MMHG | HEART RATE: 81 BPM | DIASTOLIC BLOOD PRESSURE: 81 MMHG

## 2018-03-31 VITALS — HEART RATE: 87 BPM | OXYGEN SATURATION: 96 %

## 2018-03-31 VITALS — OXYGEN SATURATION: 95 % | HEART RATE: 91 BPM

## 2018-03-31 VITALS — OXYGEN SATURATION: 94 %

## 2018-03-31 VITALS — OXYGEN SATURATION: 96 %

## 2018-03-31 LAB
CREAT SERPL-MCNC: 0.8 MG/DL (ref 0.6–1.2)
EOSINOPHIL NFR BLD AUTO: 237 K/UL (ref 130–400)
HCT VFR BLD CALC: 38.6 % (ref 37–47)
HGB BLD-MCNC: 13.3 G/DL (ref 12–16)
MCH RBC QN AUTO: 28.2 PG (ref 25–34)
MCHC RBC AUTO-ENTMCNC: 34.5 G/DL (ref 32–36)
MCV RBC AUTO: 81.8 FL (ref 80–100)
PMV BLD AUTO: 9 FL (ref 7.4–10.4)
RED CELL DISTRIBUTION WIDTH CV: 14.8 % (ref 11.5–14.5)
RED CELL DISTRIBUTION WIDTH SD: 44.3 FL (ref 36.4–46.3)
WBC # BLD AUTO: 10.17 K/UL (ref 4.8–10.8)

## 2018-03-31 RX ADMIN — HUMAN INSULIN SCH MLS/HR: 100 INJECTION, SOLUTION SUBCUTANEOUS at 09:25

## 2018-03-31 RX ADMIN — Medication SCH MG: at 07:58

## 2018-03-31 RX ADMIN — HUMAN INSULIN SCH MLS/HR: 100 INJECTION, SOLUTION SUBCUTANEOUS at 20:28

## 2018-03-31 RX ADMIN — INSULIN ASPART SCH UNITS: 100 INJECTION, SOLUTION INTRAVENOUS; SUBCUTANEOUS at 08:42

## 2018-03-31 RX ADMIN — BENZONATATE SCH MG: 100 CAPSULE ORAL at 20:31

## 2018-03-31 RX ADMIN — HUMAN INSULIN SCH MLS/HR: 100 INJECTION, SOLUTION SUBCUTANEOUS at 10:31

## 2018-03-31 RX ADMIN — PAROXETINE SCH MG: 20 TABLET, FILM COATED ORAL at 07:56

## 2018-03-31 RX ADMIN — INSULIN ASPART SCH UNITS: 100 INJECTION, SOLUTION INTRAVENOUS; SUBCUTANEOUS at 12:32

## 2018-03-31 RX ADMIN — BENZONATATE SCH MG: 100 CAPSULE ORAL at 07:58

## 2018-03-31 RX ADMIN — IPRATROPIUM BROMIDE AND ALBUTEROL SULFATE SCH ML: .5; 3 SOLUTION RESPIRATORY (INHALATION) at 14:58

## 2018-03-31 RX ADMIN — INSULIN ASPART SCH UNITS: 100 INJECTION, SOLUTION INTRAVENOUS; SUBCUTANEOUS at 21:56

## 2018-03-31 RX ADMIN — HUMAN INSULIN SCH MLS/HR: 100 INJECTION, SOLUTION SUBCUTANEOUS at 01:18

## 2018-03-31 RX ADMIN — INSULIN ASPART SCH UNITS: 100 INJECTION, SOLUTION INTRAVENOUS; SUBCUTANEOUS at 17:44

## 2018-03-31 RX ADMIN — GABAPENTIN SCH MG: 300 CAPSULE ORAL at 20:34

## 2018-03-31 RX ADMIN — PANTOPRAZOLE SCH MG: 40 TABLET, DELAYED RELEASE ORAL at 07:59

## 2018-03-31 RX ADMIN — HUMAN INSULIN SCH MLS/HR: 100 INJECTION, SOLUTION SUBCUTANEOUS at 06:31

## 2018-03-31 RX ADMIN — ATORVASTATIN CALCIUM SCH MG: 40 TABLET, FILM COATED ORAL at 07:55

## 2018-03-31 RX ADMIN — OSELTAMIVIR PHOSPHATE SCH MG: 75 CAPSULE ORAL at 20:32

## 2018-03-31 RX ADMIN — HUMAN INSULIN SCH MLS/HR: 100 INJECTION, SOLUTION SUBCUTANEOUS at 19:34

## 2018-03-31 RX ADMIN — IPRATROPIUM BROMIDE AND ALBUTEROL SULFATE SCH ML: .5; 3 SOLUTION RESPIRATORY (INHALATION) at 11:18

## 2018-03-31 RX ADMIN — HUMAN INSULIN SCH MLS/HR: 100 INJECTION, SOLUTION SUBCUTANEOUS at 23:26

## 2018-03-31 RX ADMIN — IPRATROPIUM BROMIDE AND ALBUTEROL SULFATE SCH ML: .5; 3 SOLUTION RESPIRATORY (INHALATION) at 07:12

## 2018-03-31 RX ADMIN — ENOXAPARIN SODIUM SCH MG: 40 INJECTION SUBCUTANEOUS at 20:35

## 2018-03-31 RX ADMIN — IPRATROPIUM BROMIDE AND ALBUTEROL SULFATE SCH ML: .5; 3 SOLUTION RESPIRATORY (INHALATION) at 19:30

## 2018-03-31 RX ADMIN — Medication SCH INTER.UNIT: at 07:56

## 2018-03-31 RX ADMIN — MONTELUKAST SODIUM SCH MG: 10 TABLET, FILM COATED ORAL at 20:35

## 2018-03-31 RX ADMIN — OSELTAMIVIR PHOSPHATE SCH MG: 75 CAPSULE ORAL at 07:56

## 2018-03-31 RX ADMIN — INSULIN GLARGINE SCH UNITS: 100 INJECTION, SOLUTION SUBCUTANEOUS at 17:46

## 2018-03-31 RX ADMIN — HUMAN INSULIN SCH MLS/HR: 100 INJECTION, SOLUTION SUBCUTANEOUS at 22:52

## 2018-03-31 RX ADMIN — HUMAN INSULIN SCH MLS/HR: 100 INJECTION, SOLUTION SUBCUTANEOUS at 02:07

## 2018-03-31 RX ADMIN — BENZONATATE SCH MG: 100 CAPSULE ORAL at 15:33

## 2018-03-31 RX ADMIN — HUMAN INSULIN SCH MLS/HR: 100 INJECTION, SOLUTION SUBCUTANEOUS at 13:27

## 2018-03-31 RX ADMIN — HUMAN INSULIN SCH MLS/HR: 100 INJECTION, SOLUTION SUBCUTANEOUS at 05:13

## 2018-03-31 RX ADMIN — OXYCODONE HYDROCHLORIDE AND ACETAMINOPHEN SCH MG: 500 TABLET ORAL at 07:58

## 2018-03-31 RX ADMIN — HUMAN INSULIN SCH MLS/HR: 100 INJECTION, SOLUTION SUBCUTANEOUS at 12:38

## 2018-03-31 NOTE — PROGRESS NOTE
Subjective


Date of Service:


Mar 31, 2018.


Subjective


Pt evaluation today including:  conversation w/ patient, physical exam, chart 

review, lab review, review of studies, conversation w/ consultant, review of 

inpatient medication list


Feeling much better, up to the chair, less cough less wheezing, however blood 

glucose which is significantly high, still need insulin drip which is going on,





Review of Systems


Constitutional:  No fever, No chills, No sweats, No weight loss, No weakness, 

No fatigue, No problem reported


Eyes:  No worsening of vision, No eye pain, No redness, No discharge, No 

diplopia


ENT:  No hearing loss, No unusual epistaxis, No nasal symptoms, No sore throat, 

No tinnitus, No dental problems, No trouble swallowing


Respiratory:  + cough, + wheezing, No sputum, No shortness of breath, No 

dyspnea on exertion, No dyspnea at rest, No hemoptysis


Cardiac:  No chest pain, No orthopnea, No PND, No edema, No claudication, No 

palpitations


Abdomen:  No pain, No nausea, No vomiting, No diarrhea, No constipation


Musculoskeletal:  No joint pain, No muscle pain, No swelling, No calf pain


Female :  No dysuria, No urinary frequency, No hematuria, No incontinence, No 

abnormal vaginal bleeding, No vaginal discharge


Neurologic:  No memory loss, No paralysis, No weakness, No numbness/tingling, 

No vertigo, No balance problems


Psychiatric:  No depression symptoms, No anhedonism, No anxiety, No insomnia, 

No substance abuse


Heme:  No abnormal bleeding/bruising, No clotting problems, No swollen lymph 

nodes, No night sweats


Endo:  No fatigue, No excessive thirst, No excessive urination


Skin:  No rash, No itch, No new/changing skin lesions, No color change, No 

bleeding





Objective


Vital Signs











  Date Time  Temp Pulse Resp B/P (MAP) Pulse Ox O2 Delivery O2 Flow Rate FiO2


 


3/31/18 11:18  87 16  96 Room Air  


 


3/31/18 08:00     96 Room Air  


 


3/31/18 07:26 36.3 90 18 154/77 (102) 96 Room Air  


 


3/31/18 07:13  74 16  96 Room Air  


 


3/31/18 00:00      Room Air  


 


3/30/18 23:00 36.7 75 18 147/79 (101) 94 Room Air  


 


3/30/18 19:49  77 16  95 Room Air  


 


3/30/18 16:00      Room Air  


 


3/30/18 15:46  78 16  95 Nasal Cannula 2.0 


 


3/30/18 14:44 36.5 84 18 157/84 (108) 97  2.0 











Physical Exam


General Appearance:  WD/WN, no apparent distress


Eyes:  normal inspection, PERRL, EOMI, sclerae normal


ENT:  normal ENT inspection, hearing grossly normal, pharynx normal


Neck:  supple, no adenopathy, thyroid normal, no JVD, no carotid bruits, 

trachea midline


Respiratory/Chest:  chest non-tender, lungs clear, normal breath sounds, no 

respiratory distress, no accessory muscle use, + decreased breath sounds, + 

wheezing (Occasion)


Cardiovascular:  regular rate, rhythm, no edema, no gallop, no JVD, no murmur


Abdomen:  normal bowel sounds, non tender, soft, no organomegaly, no pulsatile 

mass


Extremities:  normal range of motion, non-tender, normal inspection, no pedal 

edema, no calf tenderness, normal capillary refill, pelvis stable


Neurologic/Psychiatric:  CNs II-XII nml as tested, no motor/sensory deficits, 

alert, normal mood/affect, oriented x 3


Skin:  normal color, warm/dry, no rash


Lymphatic:  no adenopathy





Laboratory Results





Last 24 Hours








Test


  3/30/18


15:05 3/30/18


16:03 3/30/18


17:08 3/30/18


18:12


 


Bedside Glucose 229 mg/dl  197 mg/dl  154 mg/dl  133 mg/dl 


 


Test


  3/30/18


19:19 3/30/18


20:00 3/30/18


21:02 3/30/18


21:58


 


Bedside Glucose 199 mg/dl  187 mg/dl  169 mg/dl  115 mg/dl 


 


Test


  3/30/18


23:09 3/31/18


00:04 3/31/18


01:05 3/31/18


01:59


 


Bedside Glucose 98 mg/dl  99 mg/dl  97 mg/dl  93 mg/dl 


 


Test


  3/31/18


03:03 3/31/18


03:57 3/31/18


05:00 3/31/18


06:03


 


Bedside Glucose 103 mg/dl  114 mg/dl  109 mg/dl  


 


White Blood Count    10.17 K/uL 


 


Red Blood Count    4.72 M/uL 


 


Hemoglobin    13.3 g/dL 


 


Hematocrit    38.6 % 


 


Mean Corpuscular Volume    81.8 fL 


 


Mean Corpuscular Hemoglobin    28.2 pg 


 


Mean Corpuscular Hemoglobin


Concent 


  


  


  34.5 g/dl 


 


 


RDW Standard Deviation    44.3 fL 


 


RDW Coefficient of Variation    14.8 % 


 


Platelet Count    237 K/uL 


 


Mean Platelet Volume    9.0 fL 


 


Creatinine    0.80 mg/dl 


 


Est Creatinine Clear Calc


Drug Dose 


  


  


  98.5 ml/min 


 


 


Estimated GFR (


American) 


  


  


  96.2 


 


 


Estimated GFR (Non-


American 


  


  


  83.0 


 


 


Test


  3/31/18


06:16 3/31/18


07:01 3/31/18


08:09 3/31/18


09:02


 


Bedside Glucose 137 mg/dl  132 mg/dl  131 mg/dl  179 mg/dl 


 


Test


  3/31/18


10:21 3/31/18


11:19 3/31/18


12:26 3/31/18


13:24


 


Bedside Glucose 215 mg/dl  177 mg/dl  136 mg/dl  178 mg/dl 











Assessment and Plan


57-year-old white female admission from Dr. Welsh's office on March 29, 2018 

because of cough and possible asthma, failed 3 full courses of antibiotics as 

outpatient





Cough/ongoing asthmatic bronchitis, bronchoscope done on March 30, 2008 by Dr. Welsh, procedure went well


per report: Possible have some mucus plugging


Discussed with Dr. Welsh, continue taper down Solu-Medrol to oral prednisone, 

continue nebulizer treatment, increase activity, and follow-up with him in his 

office in 1 week





Influenza as above, Tamiflu, continue





Uncontrolled type 1 diabetes  A1c was 11.3 , increased Lantus to 62 unit at 

bedtime, continue insulin drip, continue insulin sliding scale, taper down off 

steroids will help hypoglycemic condition





Hypertension.  Has discontinued  her lisinopril, the cough may be from 

lisinopril too, will watch her blood pressure never


Patient systolic blood pressure was at 150s, will not give any blood pressure 

medicine for now





Hyperlipidemia.  Continue Lipitor.





not Discharge patient today because of still significant hyperglycemia which 

required insulin drip,





Deep venous thrombosis prophylaxis, Lovenox.





Possible discharge home tomorrow


Continued [facility] stay due to:  multiple IV medications needed


Discharge planning:  home

## 2018-03-31 NOTE — PHARMACY PROGRESS NOTE
Pharmacy Glycemic Short Note 2


Date of Service


Mar 31, 2018.





OUTPATIENT ANTIDIABETIC REGIMEN: 


* Lantus 60 units qHS


* Humalog using CR 1 unit per 5 gm CHO w/ breakfast and lunch, 1 unit per 3 gm 

CHO w/ dinner


* A1c = 11.3% on 3/29/18











Item Value  Date Time


 


Bedside Glucose 179 mg/dl H 3/31/18 0902


 


Bedside Glucose 131 mg/dl H 3/31/18 0809


 


Bedside Glucose 132 mg/dl H 3/31/18 0701


 


Bedside Glucose 137 mg/dl H 3/31/18 0616


 


Bedside Glucose 109 mg/dl H 3/31/18 0500


 


Bedside Glucose 114 mg/dl H 3/31/18 0357


 


Bedside Glucose 103 mg/dl H 3/31/18 0303


 


Bedside Glucose 93 mg/dl H 3/31/18 0159


 


Bedside Glucose 97 mg/dl H 3/31/18 0105


 


Bedside Glucose 99 mg/dl H 3/31/18 0004


 


  





Bedside Glucose 98 mg/dl H 3/30/18 2309


 


Bedside Glucose 115 mg/dl H 3/30/18 2158


 


Bedside Glucose 169 mg/dl H 3/30/18 2102


 


Bedside Glucose 187 mg/dl H 3/30/18 2000


 


Bedside Glucose 199 mg/dl H 3/30/18 1919


 


Bedside Glucose 133 mg/dl H 3/30/18 1812


 


Bedside Glucose 154 mg/dl H 3/30/18 1708


 


Bedside Glucose 197 mg/dl H 3/30/18 1603


 


Bedside Glucose 229 mg/dl H 3/30/18 1505


 


Bedside Glucose 215 mg/dl H 3/30/18 1406


 


Bedside Glucose 189 mg/dl H 3/30/18 1315


 


Bedside Glucose 172 mg/dl H 3/30/18 1206


 


Bedside Glucose 157 mg/dl H 3/30/18 1045


 


Bedside Glucose 143 mg/dl H 3/30/18 0904


 


Bedside Glucose 124 mg/dl H 3/30/18 0753


 


Bedside Glucose 110 mg/dl H 3/30/18 0700


 


Bedside Glucose 119 mg/dl H 3/30/18 0610


 


Bedside Glucose 120 mg/dl H 3/30/18 0458














ASSESSMENT:


* 58yo poorly controlled diabetic with significant steroid induced 

hyperglycemia. 


* Pt has been receiving outpatient SQ insulin dosing + IV insulin infusion 

while on high dose RTC steroids. IV insulin infusion is serving as "correctional

" insulin for stress/steroid hyperglycemia. 


* Steroids have been changed from Solumendrol to Prednisone BIDM. Should see an 

improvement in BSGs with this step down in steroid dosing. 


* IV insulin infusion is running at ~ 3 units/hr in addition to outpatient SQ 

insulin dosing. 


 * Will give additional dose of basal insulin to wean IV insulin infusion off 

in anticipation of d/c soon. 


* Continue to titrate insulin dosing based on BSG trends/steroid dosing 











PLAN FOR INPATIENT GLYCEMIC CONTROL:


* Start to transition off of IV insulin infusion


 * Extra dose of Lantus 30 units SQ x 1 dose this AM


 * D/C IV insulin insulin infusion when BSG <180 mg/dl x 2 AND IV insulin 

infusion rate is below 1 unit/hr





* Basal insulin


 * Lantus 62 units SQ HS





* Bolus insulin


 * NovoLog per scale ACHS or Q6hrs while NPO


 * Goal Range:  Low 110 mg/dL - High 160 mg/dL


 * Correction Factor:  IV Insulin Infusion 


 * Nutritional / Prandial insulin per carb ratio of  1 unit per 3 grams CHO 

consumed








PLAN FOR DISCHARGE:


* Poor outpatient control per recent A1c of 11.3% on 3/29/18


* Despite poor outpatient control, would not recommend significant dose 

adjustments as pt admits to not taking insulin/SMBG as prescribed. Pt may need 

BID Lantus dosing if discharged on BID prednisone. 


* A1c > 9% - follow-up with Darryl on April 13th at 9:15am

## 2018-03-31 NOTE — PULMONARY PROGRESS NOTE
DATE: 03/31/2018

 

TIME:  7:40 a.m.

 

SUBJECTIVE:  The patient is coughing less.  She had bronchoscopy done

yesterday by Dr. Welsh.  He found some secretions.  Her cough has

definitively decreased.  She is still having wheezes, however.  In spite of

this, she does not feel short of breath.  The Gram stain of bronchoscopy

showed many polys but with few organisms.  Culture is pending.  Fungal smear

was negative.

 

OBJECTIVE:

GENERAL:  The patient is comfortable.  She is coughing much less than when I

had seen her previously.  Temperature is 36.3.

ENT:  Exam is unremarkable.

HEART:  Heart rate is 90 per minute.  The rhythm is regular.  Blood pressure

154/77.

LUNGS:  Lung fields reveal mild to moderate wheezes bilaterally.  Respiratory

rate is 18 and in no distress.  Oxygen saturation on room air is 96%.

EXTREMITIES:  Show no cyanosis, clubbing or edema.

 

The patient has had numerous blood sticks overnight for blood sugars on an

hourly basis.  The highest was 137.  White count today is 10.17.  Hemoglobin

13.3.  Platelets 237,000.

 

IMPRESSIONS:

1.  Intractable cough -- improved.

2.  Influenza A.

 

COMMENTS:  The cause of the cough is still not clear.  It may be asthmatic

bronchitis.  There could be a reflux component.  Lisinopril was stopped after

admission and that could definitely play a role.  The patient is stable from

a respiratory perspective and I would have no objection to discharge from our

end.  I would send her home on prednisone at 40 mg per day.  She should

continue with nebulizer treatments at home.  I would continue with

pantoprazole upon discharge.  It is not clear exactly what is helping but she

is definitely better, although still has some wheezes.  She is to follow up

with Dr. Welsh next week.

## 2018-04-01 VITALS
TEMPERATURE: 98.24 F | DIASTOLIC BLOOD PRESSURE: 81 MMHG | SYSTOLIC BLOOD PRESSURE: 150 MMHG | OXYGEN SATURATION: 96 % | HEART RATE: 94 BPM

## 2018-04-01 VITALS
DIASTOLIC BLOOD PRESSURE: 81 MMHG | TEMPERATURE: 98.24 F | HEART RATE: 94 BPM | SYSTOLIC BLOOD PRESSURE: 150 MMHG | OXYGEN SATURATION: 96 %

## 2018-04-01 VITALS — OXYGEN SATURATION: 97 % | HEART RATE: 88 BPM

## 2018-04-01 RX ADMIN — INSULIN ASPART SCH UNITS: 100 INJECTION, SOLUTION INTRAVENOUS; SUBCUTANEOUS at 08:35

## 2018-04-01 RX ADMIN — INSULIN ASPART SCH UNITS: 100 INJECTION, SOLUTION INTRAVENOUS; SUBCUTANEOUS at 04:00

## 2018-04-01 RX ADMIN — HUMAN INSULIN SCH MLS/HR: 100 INJECTION, SOLUTION SUBCUTANEOUS at 00:30

## 2018-04-01 RX ADMIN — Medication SCH MG: at 08:30

## 2018-04-01 RX ADMIN — OSELTAMIVIR PHOSPHATE SCH MG: 75 CAPSULE ORAL at 08:29

## 2018-04-01 RX ADMIN — Medication SCH MG: at 08:29

## 2018-04-01 RX ADMIN — Medication SCH INTER.UNIT: at 08:29

## 2018-04-01 RX ADMIN — IPRATROPIUM BROMIDE AND ALBUTEROL SULFATE SCH ML: .5; 3 SOLUTION RESPIRATORY (INHALATION) at 07:05

## 2018-04-01 RX ADMIN — ATORVASTATIN CALCIUM SCH MG: 40 TABLET, FILM COATED ORAL at 08:31

## 2018-04-01 RX ADMIN — OXYCODONE HYDROCHLORIDE AND ACETAMINOPHEN SCH MG: 500 TABLET ORAL at 08:29

## 2018-04-01 RX ADMIN — PAROXETINE SCH MG: 20 TABLET, FILM COATED ORAL at 08:30

## 2018-04-01 RX ADMIN — BENZONATATE SCH MG: 100 CAPSULE ORAL at 08:31

## 2018-04-01 NOTE — DISCHARGE INSTRUCTIONS
Discharge Instructions


Date of Service


Apr 1, 2018.





Admission


Reason for Admission:  Severe Asthmatic Bronchitis





Discharge


Discharge Diagnosis / Problem:  asthmatic bronchitis,





Discharge Goals


Goal(s):  Decrease discomfort, Improve function, Increase independence, Improve 

disease control, Improve nutritional status, Learn about illness, Diagnostic 

testing, Therapeutic intervention, Prevent Disease Progression, Specific goals





Activity Recommendations


Activity Limitations:  resume your previous activity





.





Instructions / Follow-Up


Instructions / Follow-Up


you ahve asthmatic bronchitis, bronchoscope done on March 30, 2008 by Dr. Welsh p


you have Influenza, continue  Tamiflu


you have Uncontrolled type 1 diabetes  A1c was 11.3 , increased Lantus to 62 

unit at bedtime, 


you have Hypertension.  Has discontinued  Lisinopril, the cough may be from 

Lisinopril too, will watch her blood pressure, need to follow up with PCP to 

decide new mediation or not


I am giving oyu tapering dose of Prednison.





- you need to follow up with your primary care physician in 1 week,


- take medication as instructed, never overdose or any misuse, or take with 

alcohol,   because misuse of medicine may  cause organ damage or death, call 

your primary care physician if have questions of medicaitons.


- call your primary care physician OR go to local emergency room if has any 

fever/chill, chest pain, shortness of breathing, nausea/vomiting/abdominal pain

, facial droop/slurry speech/local weakness, or if has any questions. 


- fall precaution


- diet as instructed


- you need to follow up with your subspecialist Dr. Welsh in 1-2 week





Current Hospital Diet


Patient's current hospital diet: Diabetes Type 1 Diet





Discharge Diet


Recommended Diet:  Diabetes Type 1 Diet





Procedures


Procedures Performed:  


Bronchoscopy





Pending Studies


Studies pending at discharge:  no





Laboratory Results





Hemoglobin A1c








Test


  3/29/18


06:03 Range/Units


 


 


Estimated Average Glucose 278   mg/dl


 


Hemoglobin A1c 11.3 H 4.5-5.6  %











Medical Emergencies








.


Who to Call and When:





Medical Emergencies:  If at any time you feel your situation is an emergency, 

please call 911 immediately.





.





Non-Emergent Contact


Non-Emergency issues call your:  Primary Care Provider, Pulmonologist





.


.








"Provider Documentation" section prepared by Gui Hope.








.

## 2018-04-01 NOTE — DISCHARGE SUMMARY
Discharge Summary


Date of Service


Apr 1, 2018.





Discharge Summary


Admission Date:


Mar 28, 2018 at 11:20


Discharge Date:  Apr 1, 2018


Discharge Disposition:  Home


Principal Diagnosis:  asthmatic bronchitis


Problems/Secondary Diagnoses:


Influenza, 


Uncontrolled type 1 diabetes  A1c was 11.3 , 


 Hypertension.


Immunizations:  


   Have You Had Influenza Vaccine:  No


   History of Tetanus Vaccine?:  Yes


   History of Pneumococcal:  No


   History of Hepatitis B Vaccine:  No


Procedures:


Feeling good, up and walk, ready to go home,


Consultations:


Pulmonologist





Medication Reconciliation


New Medications:  


Ipratropium-Albuterol (Combivent Respimat) 1 Aer Aer


7 PUFFS INH QID, #1 INH





Losartan Potassium (Cozaar) 25 Mg Tab


1 TAB PO DAILY for 30 Days, #30 TAB 5 Refills





Prednisone Tab (Prednisone) 10 Mg Tab


10 MG PO Q12 for 10 Days, #38 TAB


35mg po daily for 3 days, then


 30mg pod daily for 3 days, then


 25mg po daily for 3 days, then


 20mg po daily for 3 days, then


 10mg po daily for 3 days, then stop


Oseltamivir Phosphate (Tamiflu) 75 Mg Cap


75 MG PO BID for 2 Days, CAP





 


Continued Medications:  


Ascorbic Acid (Vitamin C) 500 Mg Tab


1 TAB PO DAILY, #1 TAB





Aspirin (Aspirin Ec) 81 Mg Tab


81 MG PO DAILY


ADVISED PER SURGEONS OFFICE TO STOP ONE WEEK IN ADVANCE


Atorvastatin (Lipitor) 40 Mg Tab


1 TAB PO DAILY, #1 TAB





Benzonatate (Tessalon Perles) 100 Mg Cap


2 CAP PO TID for 7 Days, #21 CAP





Cholecalciferol (Vitamin D3) 1,000 Inter.unit Tab


2 TAB PO DAILY, #1 TAB





Gabapentin (Gabapentin) 300 Mg Cap


900 MG PO HS, #1 TAB





Insulin Glargine (Lantus Solostar) 100 Unit/Ml Inj


60 UNIT SC HS, #1 PEN





Insulin Human Lispro (Humalog Kwikpen) 100 Units/Ml Inj


1 UNIT SQ UD, #1 PEN





Ipratropium-Albuterol (Duoneb) 3 Ml Nebu


1 TREATMENT INH QID, #1 INHA





Montelukast Sodium (Singulair) 10 Mg Tab


1 TAB PO DAILY for 90 Days, #90 TAB 1 Refill





Paroxetine Hcl (Paxil) 10 Mg Tab


1 TAB PO DAILY for 30 Days, #30 TAB 3 Refills





 


Discontinued Medications:  


Lisinopril (Lisinopril) 2.5 Mg Tab


1 TAB PO DAILY, #1 TAB











Discharge Exam


Doing okay, eating well, no complaint


Review of Systems:  


   Constitutional:  No fever, No chills, No sweats, No weight loss, No weakness

, No fatigue, No problem reported


   Eyes:  No worsening of vision, No eye pain, No redness, No discharge, No 

diplopia, No problem reported


   ENT:  No hearing loss, No unusual epistaxis, No nasal symptoms, No sore 

throat, No tinnitus, No dental problems, No trouble swallowing, No problem 

reported


   Respiratory:  + cough, + shortness of breath


   Cardiovascular:  No chest pain, No orthopnea, No PND, No edema, No 

claudication, No palpitations, No problem reported


   Abdomen:  No pain, No nausea, No vomiting, No diarrhea, No constipation, No 

GI bleeding, No problem reported


   Musculoskeletal:  No joint pain, No muscle pain, No swelling, No calf pain, 

No problem reported


   Genitourinary - Female:  No dysuria, No urinary frequency, No urinary urgency

, No urinary incontinence, No urinary retention, No hematuria, No dysmenorrhea, 

No menorrhagia, No metrorrhagia, No rash, No vaginal bleeding, No vaginal 

discharge, No vaginal itching, No vulvodynia, No pregnancy, No problem reported


   Neurologic:  No memory loss, No paralysis, No weakness, No numbness/tingling

, No vertigo, No balance problems, No problem reported


   Psychiatric:  No depression symptoms, No anhedonism, No anxiety, No insomnia

, No substance abuse, No problem reported


   Endocrine:  No fatigue, No excessive thirst, No excessive urination, No 

problem reported


   Hematologic / Lymphatic:  No abnormal bleeding/bruising, No clotting problems

, No swollen lymph nodes, No night sweats, No problem reported


   Integumentary:  No rash, No itch, No new/changing skin lesions, No color 

change, No bleeding, No problem reported


Physical Exam:  


   General Appearance:  WD/WN, no apparent distress


   Eyes:  normal inspection, PERRL


   ENT:  normal ENT inspection, hearing grossly normal


   Neck:  supple, no adenopathy, thyroid normal


   Respiratory/Chest:  chest non-tender, + decreased breath sounds, + wheezing (

Occasional wheezing)


   Cardiovascular:  regular rate, rhythm, no edema, no gallop, no JVD, no murmur

, normal peripheral pulses


   Abdomen / GI:  normal bowel sounds, non tender, soft, no organomegaly, no 

pulsatile mass


   Extremities:  normal inspection, no calf tenderness, normal capillary refill

, no pedal edema


   Neurologic/Psychiatric:  CNs II-XII nml as tested, no motor/sensory deficits

, alert, normal mood/affect, normal reflexes, oriented x 3


   Skin:  normal color, warm/dry





Hospital Course


57-year-old white female admission from Dr. Welsh's office on March 29, 2018 

because of cough and possible asthma, failed 3 full courses of antibiotics as 

outpatient





Cough/ongoing asthmatic bronchitis, bronchoscope done on March 30, 2008 by Dr. Welsh, procedure went well


per report: Possible have some mucus plugging


Discussed with Dr. Welsh, continue taper down Solu-Medrol to oral prednisone, 

continue nebulizer treatment, increase activity, and follow-up with him in his 

office in 1 week





Influenza as above, Tamiflu, continue





Uncontrolled type 1 diabetes  A1c was 11.3 , has been on insulin drip, and  

insulin sliding scale, taper down off steroids , restart Lantus after stop 

insulin drip, blood glucose has been so far so good,  around 150s





Hypertension.  Has discontinued  her lisinopril, the cough may be from 

lisinopril too, lisinopril discontinued in this admission


Patient systolic blood pressure was at 150s, start losartan p.o. for optimal 

blood pressure control because patient is type I diabetic uncontrolled which 

will have been protected of kidney function


Hyperlipidemia.  Continue Lipitor.





significant hyperglycemia which required insulin drip in this admission, told 

patient to follow-up her endocrinologist, she agreed





Deep venous thrombosis prophylaxis, Lovenox.





discharge home today





Instructions / Follow-Up


you ahve asthmatic bronchitis, bronchoscope done on March 30, 2008 by Dr. Weslh

, p


you have Influenza, continue  Tamiflu


you have Uncontrolled type 1 diabetes  A1c was 11.3 , increased Lantus to 62 

unit at bedtime, 


you have Hypertension.  Has discontinued  Lisinopril, the cough may be from 

Lisinopril too, losartan 25 minute was at home started


I am giving you tapering dose of Prednisone





- you need to follow up with your primary care physician in 1 week,


- take medication as instructed, never overdose or any misuse, or take with 

alcohol,   because misuse of medicine may  cause organ damage or death, call 

your primary care physician if have questions of medicaitons.


- call your primary care physician OR go to local emergency room if has any 

fever/chill, chest pain, shortness of breathing, nausea/vomiting/abdominal pain

, facial droop/slurry speech/local weakness, or if has any questions. 


- fall precaution


- diet as instructed


- you need to follow up with your subspecialist Dr. Welsh in 1-2 week


Total Time Spent:  Greater than 30 minutes


This includes examination of the patient, discharge planning, medication 

reconciliation, and communication with other providers.





Discharge Instructions


Please refer to the electronic Patient Visit Report (Discharge Instructions) 

for additional information.





Additional Copies To


Doug Khan D.O.; Flynn Welsh M.D.

## 2018-05-09 ENCOUNTER — HOSPITAL ENCOUNTER (OUTPATIENT)
Dept: HOSPITAL 45 - C.MAMM | Age: 58
Discharge: HOME | End: 2018-05-09
Attending: OBSTETRICS & GYNECOLOGY
Payer: COMMERCIAL

## 2018-05-09 DIAGNOSIS — Z12.31: Primary | ICD-10-CM

## 2018-05-09 NOTE — MAMMOGRAPHY REPORT
BILATERAL DIGITAL SCREENING MAMMOGRAM TOMOSYNTHESIS WITH CAD: 5/9/2018

CLINICAL HISTORY: Routine screening.  Patient has no complaints.  





TECHNIQUE:  Breast tomosynthesis in addition to standard 2D mammography was performed. Current study 
was also evaluated with a Computer Aided Detection (CAD) system.  



COMPARISON: Comparison is made to exams dated:  5/8/2017 mammogram, 5/3/2016 mammogram, 5/1/2015 mamm
ogram, and 4/30/2014 mammogram - [facility].   



BREAST COMPOSITION:  There are scattered areas of fibroglandular density in both breasts.  



FINDINGS:  No suspicious masses, calcifications, or areas of architectural distortion are noted in ei
ther breast. There has been no significant interval change compared to prior exams.  





IMPRESSION:  ACR BI-RADS CATEGORY 1: NEGATIVE

There is no mammographic evidence of malignancy. A 1 year screening mammogram is recommended.  The pa
tient will receive written notification of the results.  





Approximately 10% of breast cancers are not detected with mammography. A negative mammographic report
 should not delay biopsy if a clinically suggestive mass is present.



Marry Li M.D.          

ah/:5/9/2018 10:25:40  



Imaging Technologist: Rupa Cruz RT(R)(M), [facility]

letter sent: Normal 1/2  

BI-RADS Code: ACR BI-RADS Category 1: Negative